# Patient Record
Sex: MALE | Race: WHITE | Employment: OTHER | URBAN - NONMETROPOLITAN AREA
[De-identification: names, ages, dates, MRNs, and addresses within clinical notes are randomized per-mention and may not be internally consistent; named-entity substitution may affect disease eponyms.]

---

## 2017-09-13 ENCOUNTER — HOSPITAL ENCOUNTER (OUTPATIENT)
Age: 67
Discharge: HOME | End: 2017-09-13
Payer: MEDICARE

## 2017-09-13 LAB
ALBUMIN SERPL-MCNC: 4.6 G/DL (ref 3.5–5.1)
ALP BLD-CCNC: 45 U/L (ref 38–126)
ALT SERPL-CCNC: 16 U/L (ref 11–66)
ANION GAP SERPL CALCULATED.3IONS-SCNC: 13 MEQ/L (ref 8–16)
AST SERPL-CCNC: 24 U/L (ref 5–40)
AVERAGE GLUCOSE: 93 MG/DL (ref 70–126)
BASOPHILS # BLD: 1.2 %
BASOPHILS ABSOLUTE: 0 THOU/MM3 (ref 0–0.1)
BILIRUB SERPL-MCNC: 0.9 MG/DL (ref 0.3–1.2)
BILIRUBIN URINE: NEGATIVE
BLOOD, URINE: NEGATIVE
BUN BLDV-MCNC: 18 MG/DL (ref 7–22)
CALCIUM SERPL-MCNC: 9.5 MG/DL (ref 8.5–10.5)
CHARACTER, URINE: CLEAR
CHLORIDE BLD-SCNC: 99 MEQ/L (ref 98–111)
CHOLESTEROL, TOTAL: 222 MG/DL (ref 100–199)
CO2: 27 MEQ/L (ref 23–33)
COLOR: YELLOW
CREAT SERPL-MCNC: 0.7 MG/DL (ref 0.4–1.2)
EOSINOPHIL # BLD: 0.7 %
EOSINOPHILS ABSOLUTE: 0 THOU/MM3 (ref 0–0.4)
GFR SERPL CREATININE-BSD FRML MDRD: > 90 ML/MIN/1.73M2
GLUCOSE BLD-MCNC: 89 MG/DL (ref 70–108)
GLUCOSE URINE: NEGATIVE MG/DL
HBA1C MFR BLD: 5.1 % (ref 4.4–6.4)
HCT VFR BLD CALC: 46.2 % (ref 42–52)
HDLC SERPL-MCNC: 96 MG/DL
HEMOGLOBIN: 15.9 GM/DL (ref 14–18)
HEPATITIS C ANTIBODY: NEGATIVE
KETONES, URINE: 40
LDL CHOLESTEROL CALCULATED: 118 MG/DL
LEUKOCYTE ESTERASE, URINE: NEGATIVE
LYMPHOCYTES # BLD: 27.9 %
LYMPHOCYTES ABSOLUTE: 1 THOU/MM3 (ref 1–4.8)
MACROCYTES: ABNORMAL
MCH RBC QN AUTO: 34.1 PG (ref 27–31)
MCHC RBC AUTO-ENTMCNC: 34.3 GM/DL (ref 33–37)
MCV RBC AUTO: 99.5 FL (ref 80–94)
MONOCYTES # BLD: 8.9 %
MONOCYTES ABSOLUTE: 0.3 THOU/MM3 (ref 0.4–1.3)
NITRITE, URINE: NEGATIVE
NUCLEATED RED BLOOD CELLS: 0 /100 WBC
PDW BLD-RTO: 13.7 % (ref 11.5–14.5)
PH UA: 6.5
PLATELET # BLD: 199 THOU/MM3 (ref 130–400)
PMV BLD AUTO: 7.8 MCM (ref 7.4–10.4)
POTASSIUM SERPL-SCNC: 5.2 MEQ/L (ref 3.5–5.2)
PROSTATE SPECIFIC ANTIGEN: 1.41 NG/ML (ref 0–1)
PROTEIN UA: NEGATIVE
RBC # BLD: 4.64 MILL/MM3 (ref 4.7–6.1)
RBC # BLD: NORMAL 10*6/UL
RHEUMATOID FACTOR: 36 IU/ML (ref 0–13)
SEDIMENTATION RATE, ERYTHROCYTE: 3 MM/HR (ref 0–10)
SEG NEUTROPHILS: 61.3 %
SEGMENTED NEUTROPHILS ABSOLUTE COUNT: 2.1 THOU/MM3 (ref 1.8–7.7)
SODIUM BLD-SCNC: 139 MEQ/L (ref 135–145)
SPECIFIC GRAVITY, URINE: 1.02 (ref 1–1.03)
TOTAL PROTEIN: 7.2 G/DL (ref 6.1–8)
TRIGL SERPL-MCNC: 39 MG/DL (ref 0–199)
TSH SERPL DL<=0.05 MIU/L-ACNC: 1.55 UIU/ML (ref 0.4–4.2)
URIC ACID: 7.5 MG/DL (ref 3.7–7)
UROBILINOGEN, URINE: 1 EU/DL
WBC # BLD: 3.5 THOU/MM3 (ref 4.8–10.8)

## 2017-09-13 PROCEDURE — 80061 LIPID PANEL: CPT

## 2017-09-13 PROCEDURE — 84550 ASSAY OF BLOOD/URIC ACID: CPT

## 2017-09-13 PROCEDURE — 86430 RHEUMATOID FACTOR TEST QUAL: CPT

## 2017-09-13 PROCEDURE — 36415 COLL VENOUS BLD VENIPUNCTURE: CPT

## 2017-09-13 PROCEDURE — G0103 PSA SCREENING: HCPCS

## 2017-09-13 PROCEDURE — 81003 URINALYSIS AUTO W/O SCOPE: CPT

## 2017-09-13 PROCEDURE — 85651 RBC SED RATE NONAUTOMATED: CPT

## 2017-09-13 PROCEDURE — 80050 GENERAL HEALTH PANEL: CPT

## 2017-09-13 PROCEDURE — 86038 ANTINUCLEAR ANTIBODIES: CPT

## 2017-09-13 PROCEDURE — 83036 HEMOGLOBIN GLYCOSYLATED A1C: CPT

## 2017-09-13 PROCEDURE — 86803 HEPATITIS C AB TEST: CPT

## 2017-09-15 LAB — ANA SCREEN: NORMAL

## 2017-09-28 ENCOUNTER — HOSPITAL ENCOUNTER (OUTPATIENT)
Age: 67
Discharge: HOME | End: 2017-09-28
Payer: MEDICARE

## 2017-09-28 LAB
BASOPHILS # BLD: 0.8 %
BASOPHILS ABSOLUTE: 0 THOU/MM3 (ref 0–0.1)
EOSINOPHIL # BLD: 0.3 %
EOSINOPHILS ABSOLUTE: 0 THOU/MM3 (ref 0–0.4)
FOLATE: 12 NG/ML (ref 4.8–24.2)
HCT VFR BLD CALC: 44.4 % (ref 42–52)
HEMOGLOBIN: 15.6 GM/DL (ref 14–18)
LYMPHOCYTES # BLD: 31.8 %
LYMPHOCYTES ABSOLUTE: 1.5 THOU/MM3 (ref 1–4.8)
MACROCYTES: ABNORMAL
MCH RBC QN AUTO: 35.1 PG (ref 27–31)
MCHC RBC AUTO-ENTMCNC: 35.1 GM/DL (ref 33–37)
MCV RBC AUTO: 100.1 FL (ref 80–94)
MONOCYTES # BLD: 10.1 %
MONOCYTES ABSOLUTE: 0.5 THOU/MM3 (ref 0.4–1.3)
NUCLEATED RED BLOOD CELLS: 0 /100 WBC
PDW BLD-RTO: 13.5 % (ref 11.5–14.5)
PLATELET # BLD: 220 THOU/MM3 (ref 130–400)
PMV BLD AUTO: 7.7 MCM (ref 7.4–10.4)
RBC # BLD: 4.44 MILL/MM3 (ref 4.7–6.1)
RBC # BLD: NORMAL 10*6/UL
SEG NEUTROPHILS: 57 %
SEGMENTED NEUTROPHILS ABSOLUTE COUNT: 2.6 THOU/MM3 (ref 1.8–7.7)
VITAMIN B-12: 148 PG/ML (ref 211–911)
WBC # BLD: 4.6 THOU/MM3 (ref 4.8–10.8)

## 2017-09-28 PROCEDURE — 82607 VITAMIN B-12: CPT

## 2017-09-28 PROCEDURE — 82746 ASSAY OF FOLIC ACID SERUM: CPT

## 2017-09-28 PROCEDURE — 36415 COLL VENOUS BLD VENIPUNCTURE: CPT

## 2017-09-28 PROCEDURE — 85025 COMPLETE CBC W/AUTO DIFF WBC: CPT

## 2017-10-31 ENCOUNTER — HOSPITAL ENCOUNTER (OUTPATIENT)
Dept: GENERAL RADIOLOGY | Age: 67
Discharge: HOME | End: 2017-10-31
Payer: MEDICARE

## 2017-10-31 ENCOUNTER — HOSPITAL ENCOUNTER (OUTPATIENT)
Age: 67
Discharge: HOME | End: 2017-10-31
Payer: MEDICARE

## 2017-10-31 DIAGNOSIS — R52 PAIN: ICD-10-CM

## 2017-10-31 DIAGNOSIS — R76.8 RHEUMATOID FACTOR POSITIVE: ICD-10-CM

## 2017-10-31 LAB
C-REACTIVE PROTEIN: 0.04 MG/DL (ref 0–1)
HAV IGM SER IA-ACNC: NEGATIVE
HEPATITIS B CORE IGM ANTIBODY: NEGATIVE
HEPATITIS B SURFACE ANTIGEN: NEGATIVE
HEPATITIS C ANTIBODY: NEGATIVE

## 2017-10-31 PROCEDURE — 86200 CCP ANTIBODY: CPT

## 2017-10-31 PROCEDURE — 73120 X-RAY EXAM OF HAND: CPT

## 2017-10-31 PROCEDURE — 80074 ACUTE HEPATITIS PANEL: CPT

## 2017-10-31 PROCEDURE — 86140 C-REACTIVE PROTEIN: CPT

## 2017-10-31 PROCEDURE — 36415 COLL VENOUS BLD VENIPUNCTURE: CPT

## 2017-11-02 LAB — CYCLIC CITRULLIN PEPTIDE AB: 5 UNITS (ref 0–19)

## 2017-11-03 ENCOUNTER — HOSPITAL ENCOUNTER (OUTPATIENT)
Age: 67
Setting detail: OUTPATIENT SURGERY
Discharge: HOME | End: 2017-11-03
Attending: INTERNAL MEDICINE | Admitting: INTERNAL MEDICINE
Payer: MEDICARE

## 2017-11-03 ENCOUNTER — ANESTHESIA (OUTPATIENT)
Dept: ENDOSCOPY | Age: 67
End: 2017-11-03
Payer: MEDICARE

## 2017-11-03 ENCOUNTER — ANESTHESIA EVENT (OUTPATIENT)
Dept: ENDOSCOPY | Age: 67
End: 2017-11-03
Payer: MEDICARE

## 2017-11-03 VITALS
DIASTOLIC BLOOD PRESSURE: 73 MMHG | RESPIRATION RATE: 16 BRPM | HEART RATE: 78 BPM | BODY MASS INDEX: 22.01 KG/M2 | WEIGHT: 177 LBS | HEIGHT: 75 IN | OXYGEN SATURATION: 98 % | TEMPERATURE: 97.8 F | SYSTOLIC BLOOD PRESSURE: 109 MMHG

## 2017-11-03 VITALS
RESPIRATION RATE: 18 BRPM | DIASTOLIC BLOOD PRESSURE: 67 MMHG | SYSTOLIC BLOOD PRESSURE: 103 MMHG | OXYGEN SATURATION: 97 %

## 2017-11-03 PROCEDURE — 3700000000 HC ANESTHESIA ATTENDED CARE: Performed by: INTERNAL MEDICINE

## 2017-11-03 PROCEDURE — 3609027000 HC COLONOSCOPY: Performed by: INTERNAL MEDICINE

## 2017-11-03 PROCEDURE — 3700000001 HC ADD 15 MINUTES (ANESTHESIA): Performed by: INTERNAL MEDICINE

## 2017-11-03 PROCEDURE — 2500000003 HC RX 250 WO HCPCS: Performed by: NURSE ANESTHETIST, CERTIFIED REGISTERED

## 2017-11-03 PROCEDURE — 7100000000 HC PACU RECOVERY - FIRST 15 MIN: Performed by: INTERNAL MEDICINE

## 2017-11-03 PROCEDURE — 2580000003 HC RX 258: Performed by: NURSE ANESTHETIST, CERTIFIED REGISTERED

## 2017-11-03 PROCEDURE — 6360000002 HC RX W HCPCS: Performed by: NURSE ANESTHETIST, CERTIFIED REGISTERED

## 2017-11-03 PROCEDURE — 2580000003 HC RX 258: Performed by: INTERNAL MEDICINE

## 2017-11-03 RX ORDER — SODIUM CHLORIDE 9 MG/ML
INJECTION, SOLUTION INTRAVENOUS CONTINUOUS PRN
Status: DISCONTINUED | OUTPATIENT
Start: 2017-11-03 | End: 2017-11-03 | Stop reason: SDUPTHER

## 2017-11-03 RX ORDER — PROPOFOL 10 MG/ML
INJECTION, EMULSION INTRAVENOUS PRN
Status: DISCONTINUED | OUTPATIENT
Start: 2017-11-03 | End: 2017-11-03 | Stop reason: SDUPTHER

## 2017-11-03 RX ORDER — ALLOPURINOL 100 MG/1
100 TABLET ORAL DAILY
COMMUNITY
End: 2018-06-01

## 2017-11-03 RX ORDER — SODIUM CHLORIDE 450 MG/100ML
INJECTION, SOLUTION INTRAVENOUS CONTINUOUS
Status: DISCONTINUED | OUTPATIENT
Start: 2017-11-03 | End: 2017-11-03 | Stop reason: HOSPADM

## 2017-11-03 RX ORDER — LIDOCAINE HYDROCHLORIDE 20 MG/ML
INJECTION, SOLUTION INFILTRATION; PERINEURAL PRN
Status: DISCONTINUED | OUTPATIENT
Start: 2017-11-03 | End: 2017-11-03 | Stop reason: SDUPTHER

## 2017-11-03 RX ADMIN — PROPOFOL 300 MG: 10 INJECTION, EMULSION INTRAVENOUS at 10:00

## 2017-11-03 RX ADMIN — PROPOFOL 200 MG: 10 INJECTION, EMULSION INTRAVENOUS at 09:58

## 2017-11-03 RX ADMIN — SODIUM CHLORIDE: 4.5 INJECTION, SOLUTION INTRAVENOUS at 09:46

## 2017-11-03 RX ADMIN — LIDOCAINE HYDROCHLORIDE 60 MG: 20 INJECTION, SOLUTION INFILTRATION; PERINEURAL at 09:58

## 2017-11-03 RX ADMIN — SODIUM CHLORIDE: 9 INJECTION, SOLUTION INTRAVENOUS at 09:55

## 2017-11-03 ASSESSMENT — PAIN - FUNCTIONAL ASSESSMENT: PAIN_FUNCTIONAL_ASSESSMENT: 0-10

## 2017-11-03 NOTE — BRIEF OP NOTE
Brief Postoperative Note  ______________________________________________________________    Patient: Dayanna Galvan  YOB: 1950  MRN: [id number]  Date of Procedure: 11/3/2017    Pre-Op Diagnosis: FAMILY HISTORY OF COLON CANCER    Post-Op Diagnosis: Same       Procedure(s):  COLONOSCOPY    Anesthesia: Monitor Anesthesia Care    Surgeon(s):  Ginny Nash MD    Staff:  * No surgical staff found *     Estimated Blood Loss: * No values recorded between 11/3/2017  9:55 AM and 11/3/2017 09:97 AM *    Complications: None    Specimens:   * No specimens in log *    Implants:  * No implants in log *      Drains:      Findings: nl colon, rectal varices    Ginny Nash MD  Date: 11/3/2017  Time: 10:50 AM

## 2017-11-04 NOTE — OP NOTE
[address] [place] [address]                               OPERATIVE REPORT    PATIENT NAME: Nadia Pressley                        :         1950  MED REC NO:   [id number]                           ROOM:  ACCOUNT NO:   [de-identified]                           ADMIT DATE:  2017  PROVIDER:     Louetta Goodness, M.D. Elias Goltz OF PROCEDURE:  2017        PROCEDURE:  Colonoscopy. INDICATION:  A 47-year-old pleasant male, who has a strong family  history of colon cancer in his father. He is undergoing colorectal  cancer screening evaluation. Denied any abdominal pain. Please see  my brief history for details and for physical examination. ASA CLASSIFICATION:  As per Anesthesia. Please see Anesthesia note  for details. INSTRUMENT:  PCF-H190AL pediatric colonoscope and CF-Q 190L  colonoscope. PHOTOGRAPHS:  Yes. BIOPSIES:  No.    Procedure, indications, and complications including, but not limited  to perforation, bleeding, infection, adverse reaction to medicine,  very slight chance of missing significant lesions discussed with the  patient. The patient expressed his understanding and a written  consent was obtained. Colonoscopy report after the rectal examination with the PCF-H190AL  pediatric colonoscope was inserted into the rectum and advanced up to  the transverse colon and the scope was not insufflating well. At that  time, the scope was changed to adult 190AL colonoscope and the scope  was inserted into the rectum and advanced up to the cecum with gentle  sigmoid pressure. On careful inspection, the preparation was good. On withdrawal of the scope, the cecum looks normal as shown in picture  #2. Ileocecal valve and ascending colon look normal as shown in  picture #1, 3, and 4. Transverse colon looks normal as shown in  picture #5 and 6. Descending colon looks normal as shown in picture  #7 and 8.   Sigmoid colon looks normal as shown in picture #9. In the  rectum, the patient had rectal varices noted as shown in picture #10. Small internal hemorrhoid hypertrophied anal papillae noted as shown  in picture #11. Air was withdrawn as the scope was removed. The  patient tolerated the procedure well without any immediate  complications. Vitals including blood pressure, heart rate, and pulse  ox were stable during the procedure and after the procedure. The  patient transferred to the recovery room in stable condition. IMPRESSION:  Colonoscopy to cecum, normal colon. No mucosal  irregularities noted. Small rectal varices noted. My recommendations, high-fiber diet, fiber supplements, followup  colonoscopy in 5 years because of the strong family history of colon  cancer, advised him to follow up with Dr. Yulia Valladares, follow up with me if  any further gastrointestinal problems should arise. Thank you Dr. Yulia Valladares for letting me to do procedure on this patient. Do not hesitate to call me if you have any questions. My  recommendations are above.         Marcelino Wheeler M.D.    D: 11/03/2017 10:53:21       T: 11/03/2017 10:56:02     [email or web address]  Job#: [id number]     Doc#: [id number]    CC:TRENT Tyler

## 2018-03-01 ENCOUNTER — HOSPITAL ENCOUNTER (OUTPATIENT)
Age: 68
Discharge: HOME | End: 2018-03-01
Payer: MEDICARE

## 2018-03-01 LAB
BASOPHILS # BLD: 0.9 %
BASOPHILS ABSOLUTE: 0 THOU/MM3 (ref 0–0.1)
EOSINOPHIL # BLD: 0.8 %
EOSINOPHILS ABSOLUTE: 0 THOU/MM3 (ref 0–0.4)
FOLATE: 15.3 NG/ML (ref 4.8–24.2)
HCT VFR BLD CALC: 46.5 % (ref 42–52)
HEMOGLOBIN: 16.1 GM/DL (ref 14–18)
LYMPHOCYTES # BLD: 38.9 %
LYMPHOCYTES ABSOLUTE: 1.3 THOU/MM3 (ref 1–4.8)
MCH RBC QN AUTO: 33.7 PG (ref 27–31)
MCHC RBC AUTO-ENTMCNC: 34.6 GM/DL (ref 33–37)
MCV RBC AUTO: 97.6 FL (ref 80–94)
MONOCYTES # BLD: 10.8 %
MONOCYTES ABSOLUTE: 0.4 THOU/MM3 (ref 0.4–1.3)
NUCLEATED RED BLOOD CELLS: 0 /100 WBC
PDW BLD-RTO: 12.9 % (ref 11.5–14.5)
PLATELET # BLD: 207 THOU/MM3 (ref 130–400)
PMV BLD AUTO: 7.7 FL (ref 7.4–10.4)
RBC # BLD: 4.77 MILL/MM3 (ref 4.7–6.1)
SEG NEUTROPHILS: 48.6 %
SEGMENTED NEUTROPHILS ABSOLUTE COUNT: 1.6 THOU/MM3 (ref 1.8–7.7)
URIC ACID: 5 MG/DL (ref 3.7–7)
VITAMIN B-12: 574 PG/ML (ref 211–911)
WBC # BLD: 3.3 THOU/MM3 (ref 4.8–10.8)

## 2018-03-01 PROCEDURE — 82607 VITAMIN B-12: CPT

## 2018-03-01 PROCEDURE — 36415 COLL VENOUS BLD VENIPUNCTURE: CPT

## 2018-03-01 PROCEDURE — 84550 ASSAY OF BLOOD/URIC ACID: CPT

## 2018-03-01 PROCEDURE — 85025 COMPLETE CBC W/AUTO DIFF WBC: CPT

## 2018-03-01 PROCEDURE — 82746 ASSAY OF FOLIC ACID SERUM: CPT

## 2018-03-08 ENCOUNTER — OFFICE VISIT (OUTPATIENT)
Dept: FAMILY MEDICINE CLINIC | Age: 68
End: 2018-03-08
Payer: MEDICARE

## 2018-03-08 VITALS
OXYGEN SATURATION: 99 % | BODY MASS INDEX: 23.43 KG/M2 | TEMPERATURE: 97.5 F | WEIGHT: 188.4 LBS | SYSTOLIC BLOOD PRESSURE: 127 MMHG | HEIGHT: 75 IN | DIASTOLIC BLOOD PRESSURE: 86 MMHG | HEART RATE: 73 BPM

## 2018-03-08 DIAGNOSIS — G62.9 NEUROPATHY: ICD-10-CM

## 2018-03-08 DIAGNOSIS — R79.89 LOW VITAMIN D LEVEL: ICD-10-CM

## 2018-03-08 DIAGNOSIS — I01.1 RHEUMATOID AORTITIS: Primary | ICD-10-CM

## 2018-03-08 DIAGNOSIS — M10.9 GOUT, UNSPECIFIED CAUSE, UNSPECIFIED CHRONICITY, UNSPECIFIED SITE: ICD-10-CM

## 2018-03-08 PROCEDURE — G8484 FLU IMMUNIZE NO ADMIN: HCPCS | Performed by: FAMILY MEDICINE

## 2018-03-08 PROCEDURE — 99203 OFFICE O/P NEW LOW 30 MIN: CPT | Performed by: FAMILY MEDICINE

## 2018-03-08 PROCEDURE — G8420 CALC BMI NORM PARAMETERS: HCPCS | Performed by: FAMILY MEDICINE

## 2018-03-08 PROCEDURE — 1123F ACP DISCUSS/DSCN MKR DOCD: CPT | Performed by: FAMILY MEDICINE

## 2018-03-08 PROCEDURE — 3017F COLORECTAL CA SCREEN DOC REV: CPT | Performed by: FAMILY MEDICINE

## 2018-03-08 PROCEDURE — G8427 DOCREV CUR MEDS BY ELIG CLIN: HCPCS | Performed by: FAMILY MEDICINE

## 2018-03-08 PROCEDURE — 1036F TOBACCO NON-USER: CPT | Performed by: FAMILY MEDICINE

## 2018-03-08 PROCEDURE — 4040F PNEUMOC VAC/ADMIN/RCVD: CPT | Performed by: FAMILY MEDICINE

## 2018-03-08 RX ORDER — CHLORAL HYDRATE 500 MG
1000 CAPSULE ORAL 3 TIMES DAILY
Qty: 90 CAPSULE | Refills: 3 | Status: SHIPPED | OUTPATIENT
Start: 2018-03-08 | End: 2018-06-01 | Stop reason: SDUPTHER

## 2018-03-08 RX ORDER — MAGNESIUM 200 MG
TABLET ORAL
Refills: 0 | COMMUNITY
Start: 2018-02-02

## 2018-03-08 ASSESSMENT — ENCOUNTER SYMPTOMS
BLOOD IN STOOL: 0
ABDOMINAL PAIN: 0
DIARRHEA: 0
SHORTNESS OF BREATH: 0
VOMITING: 0
EYE PAIN: 0
COUGH: 0
CONSTIPATION: 0
NAUSEA: 0
TROUBLE SWALLOWING: 0

## 2018-03-08 ASSESSMENT — PATIENT HEALTH QUESTIONNAIRE - PHQ9
2. FEELING DOWN, DEPRESSED OR HOPELESS: 0
1. LITTLE INTEREST OR PLEASURE IN DOING THINGS: 0
SUM OF ALL RESPONSES TO PHQ9 QUESTIONS 1 & 2: 0
SUM OF ALL RESPONSES TO PHQ QUESTIONS 1-9: 0

## 2018-03-08 NOTE — PROGRESS NOTES
[place] is a 74 y.o. male who presents today for:  Chief Complaint   Patient presents with    New Patient     Declines all vaccines     Establish Care    Numbness     and tingling in both feet. For a while, worsening. Cold feet.  Other     Patient states Dr. Tavo Isaac (previous PCP) states he is low on B12 and has crystals in joints. Goals      Exercise 3x per week (30 min per time)           HPI:     HPI   Here to establish care    He does see Dr Robert Mott - a rheumatologist - he was just taken off of the last set of medications - to go to the next set of medications - he would have to give up drinking - he does not like taking a lot of pills    He had a blood test and was low on b12 - so is on the vitamin now    He is not having any symptoms of a large prostate - did have the psa checked last year - urinates only once a night    On the allopurinol since last august  He has ankle redness if he gets cold - it feels like his shoes are too tight all th etime - the sandals are fine - but socks and shoes willdrive him nuts after a few hours - tried the B vitamin. Has been on the b vitamin since last august    Had back surgery in 200 l5 and he was in a lot of pain that got better right after the surgery - and did not have to take pain meds after that    No question data found.     Past Medical History:   Diagnosis Date    Arthritis     Gout     Vitamin B12 deficiency       Past Surgical History:   Procedure Laterality Date    BACK SURGERY      COLONOSCOPY      EYE SURGERY      FOOT SURGERY      [place] COLON CA SCRN NOT  [address] IND Left 11/3/2017    COLONOSCOPY performed by Kelly Mcnamara MD at [facility]     Family History   Problem Relation Age of Onset    Other Mother      Gout    Heart Disease Mother     Heart Disease Father     No Known Problems Sister     High Cholesterol Brother     Arthritis Brother      Social History   Substance Use Topics    Smoking status: Never Smoker    Vitamins-Lipotropics (BALANCED B-100 COMPLEX CR) TBCR    Magnesium       Plan: Will change the B vitamin - to long acting B complex - b100 complex - CNA NOT be a super complex    Will add some fish oil before you eat with eachmeal - will go 1 gram three times a day    Will recheck the PSA next year    Will check on the pneumonia shot - and bring in the immunizations    Will check the vitamin D with the next blood draw    Will stop the allopurinol for now - and take it if you get a gout attack for a week    If still feeling the foot tightness or joint pains in 3 months will go ahead and get the bloodwork     Will see you in 3 months and ask about the weakness of the legs and the neuropathy    No Follow-up on file. Orders Placed:  Orders Placed This Encounter   Procedures    Vitamin D 25 Hydroxy    Magnesium    CBC Auto Differential     Medications Prescribed:  Orders Placed This Encounter   Medications    Vitamins-Lipotropics (BALANCED B-100 COMPLEX CR) TBCR     Sig: Take 1 tablet by mouth 4 times daily (after meals and at bedtime)     Dispense:  120 tablet     Refill:  5    Omega-3 Fatty Acids (FISH OIL) 1000 MG CAPS     Sig: Take 1 capsule by mouth 3 times daily     Dispense:  90 capsule     Refill:  3        Patient given educational materials - see patient instructions. Discussed use, benefit, and side effects of prescribed medications. All patient questions answered. Pt voiced understanding. Reviewed health maintenance. Instructed to continue current medications, diet and exercise. Patient agreed with treatment plan. Follow up as directed.      Electronically signed by Nolvia Christy DO on 3/8/2018 at 9:00 AM

## 2018-03-08 NOTE — PATIENT INSTRUCTIONS
brains. ¨ Meats, including gay, beef, pork, and lamb. ¨ Game meats and any other meats in large amounts. ¨ Anchovies, sardines, herring, mackerel, and scallops. ¨ Gravy. ¨ Beer. Where can you learn more? Go to [email or web address] and sign in to your [employer] account. Enter F448 in the [employer] box to learn more about \"Purine-Restricted Diet: Care Instructions. \"     If you do not have an account, please click on the \"Sign Up Now\" link. Current as of: May 12, 2017  Content Version: 11.5  © 8664-5457 [employer]. Care instructions adapted under license by [facility] [address] If you have questions about a medical condition or this instruction, always ask your healthcare professional. Patience Kincaid any warranty or liability for your use of this information.      ill change the B vitamin - to long acting B complex - b100 complex - CNA NOT be a super complex    Will add some fish oil before you eat with eachmeal - will go 1 gram three times a day    Will recheck the PSA next year    Will check on the pneumonia shot - and bring in the immunizations    Will check the vitamin D with the next blood draw    Will stop the allopurinol for now - and take it if you get a gout attack for a week    If still feeling the foot tightness or joint pains in 3 months will go ahead and get the bloodwork     Will see you in 3 months and ask about the weakness of the legs and the neuropathy

## 2018-06-01 ENCOUNTER — OFFICE VISIT (OUTPATIENT)
Dept: FAMILY MEDICINE CLINIC | Age: 68
End: 2018-06-01
Payer: MEDICARE

## 2018-06-01 VITALS
TEMPERATURE: 96.8 F | WEIGHT: 183.2 LBS | DIASTOLIC BLOOD PRESSURE: 88 MMHG | OXYGEN SATURATION: 97 % | HEIGHT: 74 IN | SYSTOLIC BLOOD PRESSURE: 133 MMHG | BODY MASS INDEX: 23.51 KG/M2 | HEART RATE: 98 BPM

## 2018-06-01 DIAGNOSIS — G62.9 NEUROPATHY: ICD-10-CM

## 2018-06-01 DIAGNOSIS — M25.571 CHRONIC PAIN OF BOTH ANKLES: ICD-10-CM

## 2018-06-01 DIAGNOSIS — R20.9 BILATERAL COLD FEET: ICD-10-CM

## 2018-06-01 DIAGNOSIS — R97.20 ELEVATED PSA: ICD-10-CM

## 2018-06-01 DIAGNOSIS — M05.772 RHEUMATOID ARTHRITIS INVOLVING BOTH ANKLES WITH POSITIVE RHEUMATOID FACTOR (HCC): Primary | ICD-10-CM

## 2018-06-01 DIAGNOSIS — G89.29 CHRONIC PAIN OF BOTH ANKLES: ICD-10-CM

## 2018-06-01 DIAGNOSIS — M25.572 CHRONIC PAIN OF BOTH ANKLES: ICD-10-CM

## 2018-06-01 DIAGNOSIS — R09.89 OTHER SPECIFIED SYMPTOMS AND SIGNS INVOLVING THE CIRCULATORY AND RESPIRATORY SYSTEMS: ICD-10-CM

## 2018-06-01 DIAGNOSIS — M05.771 RHEUMATOID ARTHRITIS INVOLVING BOTH ANKLES WITH POSITIVE RHEUMATOID FACTOR (HCC): Primary | ICD-10-CM

## 2018-06-01 PROCEDURE — 93922 UPR/L XTREMITY ART 2 LEVELS: CPT | Performed by: FAMILY MEDICINE

## 2018-06-01 PROCEDURE — 99214 OFFICE O/P EST MOD 30 MIN: CPT | Performed by: FAMILY MEDICINE

## 2018-06-01 RX ORDER — CHLORAL HYDRATE 500 MG
1000 CAPSULE ORAL 3 TIMES DAILY
Qty: 90 CAPSULE | Refills: 5 | Status: SHIPPED | OUTPATIENT
Start: 2018-06-01 | End: 2021-04-06

## 2018-06-01 ASSESSMENT — ENCOUNTER SYMPTOMS
EYE PAIN: 0
NAUSEA: 0
ABDOMINAL PAIN: 0
CONSTIPATION: 0
DIARRHEA: 0
SHORTNESS OF BREATH: 0
COUGH: 0
VOMITING: 0
TROUBLE SWALLOWING: 0
BLOOD IN STOOL: 0

## 2018-07-31 ENCOUNTER — TELEPHONE (OUTPATIENT)
Dept: FAMILY MEDICINE CLINIC | Age: 68
End: 2018-07-31

## 2018-07-31 ENCOUNTER — HOSPITAL ENCOUNTER (OUTPATIENT)
Dept: GENERAL RADIOLOGY | Age: 68
Discharge: HOME | End: 2018-07-31
Payer: MEDICARE

## 2018-07-31 ENCOUNTER — HOSPITAL ENCOUNTER (OUTPATIENT)
Age: 68
Discharge: HOME | End: 2018-07-31
Payer: MEDICARE

## 2018-07-31 DIAGNOSIS — M25.572 CHRONIC PAIN OF BOTH ANKLES: ICD-10-CM

## 2018-07-31 DIAGNOSIS — G89.29 CHRONIC PAIN OF BOTH ANKLES: ICD-10-CM

## 2018-07-31 DIAGNOSIS — M25.571 CHRONIC PAIN OF BOTH ANKLES: ICD-10-CM

## 2018-07-31 DIAGNOSIS — I01.1 RHEUMATOID AORTITIS: ICD-10-CM

## 2018-07-31 DIAGNOSIS — R97.20 ELEVATED PSA: ICD-10-CM

## 2018-07-31 DIAGNOSIS — G62.9 NEUROPATHY: ICD-10-CM

## 2018-07-31 DIAGNOSIS — R79.89 LOW VITAMIN D LEVEL: ICD-10-CM

## 2018-07-31 DIAGNOSIS — M19.079 ARTHRITIS OF FOOT: Primary | ICD-10-CM

## 2018-07-31 LAB
BASOPHILS # BLD: 0.6 %
BASOPHILS ABSOLUTE: 0 THOU/MM3 (ref 0–0.1)
EOSINOPHIL # BLD: 0.8 %
EOSINOPHILS ABSOLUTE: 0 THOU/MM3 (ref 0–0.4)
ERYTHROCYTE [DISTWIDTH] IN BLOOD BY AUTOMATED COUNT: 13.1 % (ref 11.5–14.5)
ERYTHROCYTE [DISTWIDTH] IN BLOOD BY AUTOMATED COUNT: 47.2 FL (ref 35–45)
HCT VFR BLD CALC: 46.8 % (ref 42–52)
HEMOGLOBIN: 15.9 GM/DL (ref 14–18)
IMMATURE GRANS (ABS): 0.01 THOU/MM3 (ref 0–0.07)
IMMATURE GRANULOCYTES: 0.3 %
LYMPHOCYTES # BLD: 32.1 %
LYMPHOCYTES ABSOLUTE: 1.2 THOU/MM3 (ref 1–4.8)
MAGNESIUM: 2.1 MG/DL (ref 1.6–2.4)
MCH RBC QN AUTO: 33.3 PG (ref 26–33)
MCHC RBC AUTO-ENTMCNC: 34 GM/DL (ref 32.2–35.5)
MCV RBC AUTO: 97.9 FL (ref 80–94)
MONOCYTES # BLD: 10.8 %
MONOCYTES ABSOLUTE: 0.4 THOU/MM3 (ref 0.4–1.3)
NUCLEATED RED BLOOD CELLS: 0 /100 WBC
PLATELET # BLD: 218 THOU/MM3 (ref 130–400)
PMV BLD AUTO: 9.4 FL (ref 9.4–12.4)
PROSTATE SPECIFIC ANTIGEN: 1.31 NG/ML (ref 0–1)
RBC # BLD: 4.78 MILL/MM3 (ref 4.7–6.1)
SEG NEUTROPHILS: 55.4 %
SEGMENTED NEUTROPHILS ABSOLUTE COUNT: 2 THOU/MM3 (ref 1.8–7.7)
VITAMIN D 25-HYDROXY: 50 NG/ML (ref 30–100)
WBC # BLD: 3.6 THOU/MM3 (ref 4.8–10.8)

## 2018-07-31 PROCEDURE — 84153 ASSAY OF PSA TOTAL: CPT

## 2018-07-31 PROCEDURE — 83735 ASSAY OF MAGNESIUM: CPT

## 2018-07-31 PROCEDURE — 82306 VITAMIN D 25 HYDROXY: CPT

## 2018-07-31 PROCEDURE — 73600 X-RAY EXAM OF ANKLE: CPT

## 2018-07-31 PROCEDURE — 36415 COLL VENOUS BLD VENIPUNCTURE: CPT

## 2018-07-31 PROCEDURE — 85025 COMPLETE CBC W/AUTO DIFF WBC: CPT

## 2018-07-31 NOTE — TELEPHONE ENCOUNTER
----- Message from José Kang DO sent at 7/31/2018  5:29 PM EDT -----  The bloodwork was ok - the psa was elevated again but a little lower this time.   Would you like to get a urology referral?

## 2018-08-20 NOTE — TELEPHONE ENCOUNTER
Sandy Robertson called back wanting a referral, he will check his insurance on coverage, (pending referral for Dr. Sofia Andersen, [facility] foot and ankle doctor).  [facility],  He was wondering if he still needed to completed: XR LUMBAR SPINE (MIN 4 VIEWS) , Please advise.

## 2018-08-21 NOTE — TELEPHONE ENCOUNTER
[employer] Phone: [phone], spoke with Melita Johnson. xr cancelled. He still does not know where he wants the referral placed.

## 2018-08-23 NOTE — TELEPHONE ENCOUNTER
I signed the referral to [facility] without reading the note first.  We can go ahead and cancel it if he would like to wait until he sees us. Sorry.   Was a little too eager

## 2019-03-14 ENCOUNTER — OFFICE VISIT (OUTPATIENT)
Dept: FAMILY MEDICINE CLINIC | Age: 69
End: 2019-03-14
Payer: MEDICARE

## 2019-03-14 VITALS
BODY MASS INDEX: 23.74 KG/M2 | DIASTOLIC BLOOD PRESSURE: 82 MMHG | TEMPERATURE: 97.8 F | RESPIRATION RATE: 16 BRPM | HEIGHT: 74 IN | OXYGEN SATURATION: 98 % | HEART RATE: 90 BPM | WEIGHT: 185 LBS | SYSTOLIC BLOOD PRESSURE: 136 MMHG

## 2019-03-14 DIAGNOSIS — M77.9 BONE SPUR: ICD-10-CM

## 2019-03-14 DIAGNOSIS — L81.9 DISCOLORATION OF SKIN OF LOWER LEG: ICD-10-CM

## 2019-03-14 DIAGNOSIS — M19.071 ARTHRITIS OF BOTH FEET: Primary | ICD-10-CM

## 2019-03-14 DIAGNOSIS — Z09 ENCOUNTER FOR FOLLOW-UP EXAMINATION AFTER COMPLETED TREATMENT FOR CONDITIONS OTHER THAN MALIGNANT NEOPLASM: ICD-10-CM

## 2019-03-14 DIAGNOSIS — R60.0 LOWER EXTREMITY EDEMA: ICD-10-CM

## 2019-03-14 DIAGNOSIS — M19.072 ARTHRITIS OF BOTH FEET: Primary | ICD-10-CM

## 2019-03-14 PROCEDURE — G8510 SCR DEP NEG, NO PLAN REQD: HCPCS | Performed by: NURSE PRACTITIONER

## 2019-03-14 PROCEDURE — 99213 OFFICE O/P EST LOW 20 MIN: CPT | Performed by: NURSE PRACTITIONER

## 2019-03-14 PROCEDURE — 3288F FALL RISK ASSESSMENT DOCD: CPT | Performed by: NURSE PRACTITIONER

## 2019-03-14 RX ORDER — MULTIVIT-MIN/IRON/FOLIC ACID/K 18-600-40
500 CAPSULE ORAL DAILY
COMMUNITY

## 2019-03-14 ASSESSMENT — ENCOUNTER SYMPTOMS
EYE DISCHARGE: 0
BLOOD IN STOOL: 0
COLOR CHANGE: 0
ABDOMINAL PAIN: 0
COUGH: 0
SHORTNESS OF BREATH: 0
ANAL BLEEDING: 0
DIARRHEA: 0
NAUSEA: 0
SORE THROAT: 0
RHINORRHEA: 0
CONSTIPATION: 0
EYE REDNESS: 0
ABDOMINAL DISTENTION: 0

## 2019-03-14 ASSESSMENT — PATIENT HEALTH QUESTIONNAIRE - PHQ9
SUM OF ALL RESPONSES TO PHQ QUESTIONS 1-9: 0
SUM OF ALL RESPONSES TO PHQ QUESTIONS 1-9: 0
SUM OF ALL RESPONSES TO PHQ9 QUESTIONS 1 & 2: 0
2. FEELING DOWN, DEPRESSED OR HOPELESS: 0
1. LITTLE INTEREST OR PLEASURE IN DOING THINGS: 0

## 2019-04-01 ENCOUNTER — TELEPHONE (OUTPATIENT)
Dept: FAMILY MEDICINE CLINIC | Age: 69
End: 2019-04-01

## 2019-04-01 NOTE — TELEPHONE ENCOUNTER
Belkys Rose called stating Dr. Katerine Giordano referred him to:  [facility] [place]: [facility]      Address: [address] [place] [address] [address] [place] [facility]   Phone: [phone]    We did place the referral to Dr. Bev Brannon.      Belkys Rose would like XR's fax to Dr. Dudley Lele:    Alan Olmedo DO   Physician (Active)   [address] [place] [id number]     (E) [phone]   (W) [phone]

## 2019-04-13 ENCOUNTER — HOSPITAL ENCOUNTER (OUTPATIENT)
Age: 69
Discharge: HOME | End: 2019-04-13
Payer: MEDICARE

## 2019-04-13 LAB
ALBUMIN SERPL-MCNC: 4.2 G/DL (ref 3.5–5.1)
ALP BLD-CCNC: 42 U/L (ref 38–126)
ALT SERPL-CCNC: 11 U/L (ref 11–66)
ANION GAP SERPL CALCULATED.3IONS-SCNC: 11 MEQ/L (ref 8–16)
AST SERPL-CCNC: 18 U/L (ref 5–40)
AVERAGE GLUCOSE: 102 MG/DL (ref 70–126)
BASOPHILS # BLD: 0.6 %
BASOPHILS ABSOLUTE: 0 THOU/MM3 (ref 0–0.1)
BILIRUB SERPL-MCNC: 0.8 MG/DL (ref 0.3–1.2)
BUN BLDV-MCNC: 11 MG/DL (ref 7–22)
CALCIUM SERPL-MCNC: 9.1 MG/DL (ref 8.5–10.5)
CHLORIDE BLD-SCNC: 104 MEQ/L (ref 98–111)
CO2: 28 MEQ/L (ref 23–33)
CREAT SERPL-MCNC: 0.7 MG/DL (ref 0.4–1.2)
EOSINOPHIL # BLD: 0.6 %
EOSINOPHILS ABSOLUTE: 0 THOU/MM3 (ref 0–0.4)
ERYTHROCYTE [DISTWIDTH] IN BLOOD BY AUTOMATED COUNT: 12.6 % (ref 11.5–14.5)
ERYTHROCYTE [DISTWIDTH] IN BLOOD BY AUTOMATED COUNT: 45.3 FL (ref 35–45)
FOLATE: > 20 NG/ML (ref 4.8–24.2)
GFR SERPL CREATININE-BSD FRML MDRD: > 90 ML/MIN/1.73M2
GLUCOSE BLD-MCNC: 92 MG/DL (ref 70–108)
HBA1C MFR BLD: 5.4 % (ref 4.4–6.4)
HCT VFR BLD CALC: 45.4 % (ref 42–52)
HEMOGLOBIN: 15.4 GM/DL (ref 14–18)
IMMATURE GRANS (ABS): 0 THOU/MM3 (ref 0–0.07)
IMMATURE GRANULOCYTES: 0 %
LYMPHOCYTES # BLD: 37.8 %
LYMPHOCYTES ABSOLUTE: 1.3 THOU/MM3 (ref 1–4.8)
MCH RBC QN AUTO: 33.6 PG (ref 26–33)
MCHC RBC AUTO-ENTMCNC: 33.9 GM/DL (ref 32.2–35.5)
MCV RBC AUTO: 98.9 FL (ref 80–94)
MONOCYTES # BLD: 7.5 %
MONOCYTES ABSOLUTE: 0.3 THOU/MM3 (ref 0.4–1.3)
NUCLEATED RED BLOOD CELLS: 0 /100 WBC
PLATELET # BLD: 201 THOU/MM3 (ref 130–400)
PMV BLD AUTO: 9.2 FL (ref 9.4–12.4)
POTASSIUM SERPL-SCNC: 5.1 MEQ/L (ref 3.5–5.2)
RBC # BLD: 4.59 MILL/MM3 (ref 4.7–6.1)
SEG NEUTROPHILS: 53.5 %
SEGMENTED NEUTROPHILS ABSOLUTE COUNT: 1.9 THOU/MM3 (ref 1.8–7.7)
SODIUM BLD-SCNC: 143 MEQ/L (ref 135–145)
T4 FREE: 1.26 NG/DL (ref 0.93–1.76)
TOTAL PROTEIN: 6.7 G/DL (ref 6.1–8)
TSH SERPL DL<=0.05 MIU/L-ACNC: 1.22 UIU/ML (ref 0.4–4.2)
VITAMIN B-12: 351 PG/ML (ref 211–911)
WBC # BLD: 3.5 THOU/MM3 (ref 4.8–10.8)

## 2019-04-13 PROCEDURE — 84425 ASSAY OF VITAMIN B-1: CPT

## 2019-04-13 PROCEDURE — 82746 ASSAY OF FOLIC ACID SERUM: CPT

## 2019-04-13 PROCEDURE — 84165 PROTEIN E-PHORESIS SERUM: CPT

## 2019-04-13 PROCEDURE — 84443 ASSAY THYROID STIM HORMONE: CPT

## 2019-04-13 PROCEDURE — 86618 LYME DISEASE ANTIBODY: CPT

## 2019-04-13 PROCEDURE — 86141 C-REACTIVE PROTEIN HS: CPT

## 2019-04-13 PROCEDURE — 84439 ASSAY OF FREE THYROXINE: CPT

## 2019-04-13 PROCEDURE — 36415 COLL VENOUS BLD VENIPUNCTURE: CPT

## 2019-04-13 PROCEDURE — 84160 ASSAY OF PROTEIN ANY SOURCE: CPT

## 2019-04-13 PROCEDURE — 85025 COMPLETE CBC W/AUTO DIFF WBC: CPT

## 2019-04-13 PROCEDURE — 82784 ASSAY IGA/IGD/IGG/IGM EACH: CPT

## 2019-04-13 PROCEDURE — 83655 ASSAY OF LEAD: CPT

## 2019-04-13 PROCEDURE — 86334 IMMUNOFIX E-PHORESIS SERUM: CPT

## 2019-04-13 PROCEDURE — 82175 ASSAY OF ARSENIC: CPT

## 2019-04-13 PROCEDURE — 80053 COMPREHEN METABOLIC PANEL: CPT

## 2019-04-13 PROCEDURE — 83825 ASSAY OF MERCURY: CPT

## 2019-04-13 PROCEDURE — 84207 ASSAY OF VITAMIN B-6: CPT

## 2019-04-13 PROCEDURE — 82607 VITAMIN B-12: CPT

## 2019-04-13 PROCEDURE — 83036 HEMOGLOBIN GLYCOSYLATED A1C: CPT

## 2019-04-15 ENCOUNTER — TELEPHONE (OUTPATIENT)
Dept: FAMILY MEDICINE CLINIC | Age: 69
End: 2019-04-15

## 2019-04-15 NOTE — TELEPHONE ENCOUNTER
States he had nerve test done on Friday with Juana Rodríguez and was told he had neuropathy. He just needs to know if Dr Xin Clinton wants him to see Dr Yvette Stanton and if so he would like a referral. Please advise.

## 2019-04-17 LAB
C-REACTIVE PROTEIN, HIGH SENSITIVITY: 0.3 MG/L
LYME ANTIBODY: 0.22 LIV (ref 0–1.2)

## 2019-04-18 LAB
HEAVY METAL URINE: NORMAL
IMMUNOFIXATION WITH QUANT: NORMAL
VITAMIN B1 WHOLE BLOOD: 114 NMOL/L (ref 70–180)

## 2019-04-19 LAB — VITAMIN B6: 163.3 NMOL/L (ref 20–125)

## 2019-04-29 NOTE — TELEPHONE ENCOUNTER
These results do show peripheral neuropathy - or the nerves are having problems in the hands and feet. Can he come in to discuss?   It is more than a phone conversation    We can also sent to podiatry

## 2019-05-01 ENCOUNTER — OFFICE VISIT (OUTPATIENT)
Dept: FAMILY MEDICINE CLINIC | Age: 69
End: 2019-05-01
Payer: MEDICARE

## 2019-05-01 VITALS
SYSTOLIC BLOOD PRESSURE: 144 MMHG | OXYGEN SATURATION: 98 % | DIASTOLIC BLOOD PRESSURE: 90 MMHG | WEIGHT: 181 LBS | BODY MASS INDEX: 23.23 KG/M2 | RESPIRATION RATE: 12 BRPM | TEMPERATURE: 97.9 F | HEIGHT: 74 IN | HEART RATE: 97 BPM

## 2019-05-01 DIAGNOSIS — R94.131 ABNORMAL EMG: Primary | ICD-10-CM

## 2019-05-01 DIAGNOSIS — Z23 NEED FOR PROPHYLACTIC VACCINATION AND INOCULATION AGAINST VARICELLA: ICD-10-CM

## 2019-05-01 DIAGNOSIS — Z23 NEED FOR PROPHYLACTIC VACCINATION AGAINST DIPHTHERIA-TETANUS-PERTUSSIS (DTP): ICD-10-CM

## 2019-05-01 DIAGNOSIS — G60.9 PERIPHERAL NEUROPATHY, IDIOPATHIC: ICD-10-CM

## 2019-05-01 DIAGNOSIS — M05.80 POLYARTHRITIS WITH POSITIVE RHEUMATOID FACTOR (HCC): ICD-10-CM

## 2019-05-01 PROCEDURE — 99213 OFFICE O/P EST LOW 20 MIN: CPT | Performed by: NURSE PRACTITIONER

## 2019-05-01 ASSESSMENT — ENCOUNTER SYMPTOMS
BLOOD IN STOOL: 0
DIARRHEA: 0
SHORTNESS OF BREATH: 0
EYE REDNESS: 0
COLOR CHANGE: 1
SORE THROAT: 0
NAUSEA: 0
ABDOMINAL PAIN: 0
ANAL BLEEDING: 0
EYE DISCHARGE: 0
COUGH: 0
CONSTIPATION: 0
RHINORRHEA: 0
ABDOMINAL DISTENTION: 0

## 2019-05-01 NOTE — PATIENT INSTRUCTIONS
1. You may receive a survey about your visit with us today. The feedback from our patients helps us identify what is working well and where the service to all patients can be enhanced. Thank you! 2. Please bring in ALL medications BOTTLES, including inhalers, herbal supplements, over the counter, prescribed & non-prescribed medicine. The office would like actual medication bottles and a list.   3. Please note our OFFICE POLICIES:   1. Prior to getting your labs drawn, please check with your insurance company for benefits and eligibility of lab services. Often, insurance companies cover certain tests for preventative visits only. It is patient's responsibility to see what is covered. 2. We are unable to change a diagnosis after the test has been performed. 3. Lab orders will not be re-printed. Please hold onto your original lab orders and take them to your lab to be completed. 4. If you no show your scheduled appointment three times, you will be dismissed from this practice. 5. Reschedules must be completed 24 hours prior to your schedule appointment. 4. If the list below has been completed, PLEASE FAX RECORDS TO OUR OFFICE @ [phone]. Once the records have been received we will update your records at our office:  Health Maintenance Due   Topic Date Due    DTaP/Tdap/Td vaccine (1 - Tdap) 08/01/1971    Shingles Vaccine (1 of 2) 08/01/2002    Pneumococcal 65+ years Vaccine (1 of 2 - PCV13) 08/01/2017       Schedule your 2018/2019 flu vaccine with Dr. Rupert Figueroa call [phone]!   [facility], for anyone 9 years or older   [address]   Every Monday   8:00am-11:00am and 1:00pm-3:00pm      Patient Education     Will refer to the Neurologist for the abnormal EMG  Discussed circulation test - will hold off for right now  Discussed nerve medication - pt declines for right now  Take B complex every other day for now  Will see you back in June as scheduled, sooner pain, take it as prescribed. ? If you are not taking a prescription pain medicine, ask your doctor if you can take an over-the-counter medicine. · Save hard tasks for days when you have less pain. Follow a hard task with an easy task. And remember to take breaks. · Relax, and reduce stress. You may want to try deep breathing or meditation. These can help. · Keep moving. Gentle, daily exercise can help reduce pain. Your doctor or physical therapist can tell you what type of exercise is best for you. This may include walking, swimming, and stationary biking. It may also include stretches and range-of-motion exercises. · Try heat, cold packs, and massage. · Get enough sleep. Constant pain can make you more tired. If the pain makes it hard to sleep, talk with your doctor. · Think positively. Your thoughts can affect your pain. Do fun things to distract yourself from the pain. See a movie, read a book, listen to music, or spend time with a friend. · Keep a pain diary. Try to write down how strong your pain is and what it feels like. Also try to notice and write down how your moods, thoughts, sleep, activities, and medicine affect your pain. These notes can help you and your doctor find the best ways to treat your pain. Reducing constipation caused by pain medicine  Pain medicines often cause constipation. To reduce constipation:  · Include fruits, vegetables, beans, and whole grains in your diet each day. These foods are high in fiber. · Drink plenty of fluids, enough so that your urine is light yellow or clear like water. If you have kidney, heart, or liver disease and have to limit fluids, talk with your doctor before you increase the amount of fluids you drink. · Get some exercise every day. Build up slowly to 30 to 60 minutes a day on 5 or more days of the week. · Take a fiber supplement, such as Citrucel or Metamucil, every day if needed. Read and follow all instructions on the label.   · Schedule time each day for a bowel movement. Having a daily routine may help. Take your time and do not strain when having a bowel movement. · Ask your doctor about a laxative. The goal is to have one easy bowel movement every 1 to 2 days. Do not let constipation go untreated for more than 3 days. When should you call for help? Call your doctor now or seek immediate medical care if:    · You feel sad, anxious, or hopeless for more than a few days. This could mean you are depressed. Depression is common in people who have a lot of pain. But it can be treated.     · You have trouble with bowel movements, such as:  ? No bowel movement in 3 days. ? Blood in the anal area, in your stool, or on the toilet paper. ? Diarrhea for more than 24 hours.    Watch closely for changes in your health, and be sure to contact your doctor if:    · Your pain is getting worse.     · You can't sleep because of pain.     · You are very worried or anxious about your pain.     · You have trouble taking your pain medicine.     · You have any concerns about your pain medicine or its side effects.     · You have vomiting or cramps for more than 2 hours. Where can you learn more? Go to https://Hybrid [employer] Technologies.Cheggin. org and sign in to your Startupxplore account. Enter N320 in the Bulbstorm box to learn more about \"Neuropathic Pain: Care Instructions. \"     If you do not have an account, please click on the \"Sign Up Now\" link. Current as of: Maritza 3, 2018  Content Version: 11.9  © 6370-5978 Wandera, Incorporated. Care instructions adapted under license by [facility] ([facility]). If you have questions about a medical condition or this instruction, always ask your healthcare professional. [place] [address] any warranty or liability for your use of this information.          Patient Education        Rheumatoid Arthritis: Care Instructions  Your Care Instructions    Arthritis is a common health problem in which the joints are inflamed. There are many types of arthritis. In rheumatoid arthritis, the body's own immune system attacks the joints. This causes pain, stiffness, and swelling in the joints, especially in the hands and feet. It can become hard to open jars, write, and do other daily tasks. Sometimes rheumatoid arthritis can also cause bumps to form under the skin. Over time, rheumatoid arthritis can damage and deform joints. Early treatment with medicines may reduce your chances of having a lasting disability. Follow-up care is a key part of your treatment and safety. Be sure to make and go to all appointments, and call your doctor if you are having problems. It's also a good idea to know your test results and keep a list of the medicines you take. How can you care for yourself at home? · If your doctor recommends it, get more exercise. Walking is a good choice. If your knees or ankles hurt, try riding a stationary bike or swimming. · Move each joint gently through its full range of motion once or twice a day. · Rest joints when they are sore or overworked. Short rest breaks may help more than staying in bed. · Reach and stay at a healthy weight. Regular exercise and a healthy diet will help you do this. Extra weight can strain the joints, especially the knees and hips, and make the pain worse. Losing even a few pounds may help. · Get enough calcium and vitamin D to help prevent osteoporosis, which causes thin bones. Talk to your doctor about how much you should take. · Protect your joints from injury. Do not overuse them. Try to limit or avoid activities that cause joint pain or swelling. Use special kitchen tools and other self-help devices as well as walkers, splints, or canes if needed. · Use heat to ease pain. Take warm showers or baths. Use hot packs or a heating pad set on low. Sleep under a warm electric blanket. · Put ice or a cold pack on the area for 10 to 20 minutes at a time.  Put a thin cloth between the ice and your skin. · Take pain medicines exactly as directed. ? If the doctor gave you a prescription medicine for pain, take it as prescribed. ? If you are not taking a prescription pain medicine, ask your doctor if you can take an over-the-counter medicine. · Take an active role in managing your condition. Set up a treatment plan with your doctor, and learn as much as you can about rheumatoid arthritis. This will help you control pain and stay active. When should you call for help? Call your doctor now or seek immediate medical care if:    · You have a fever or a rash along with joint pain.     · You have joint pain that is so severe that you cannot use the joint at all.     · You have sudden swelling, redness, or pain in one or more joints, and you do not know why.     · You have back or neck pain along with weakness in your arms or legs.     · You have a loss of bowel or bladder control.    Watch closely for changes in your health, and be sure to contact your doctor if:    · You have joint pain that lasts for more than 6 weeks.     · You have side effects from your arthritis medicines, such as stomach pain, nausea, heartburn, or dark and tarlike stools. Where can you learn more? Go to https://[employer].Kuotus. org and sign in to your Pylba account. Enter K205 in the [facility] box to learn more about \"Rheumatoid Arthritis: Care Instructions. \"     If you do not have an account, please click on the \"Sign Up Now\" link. Current as of: Maritza 10, 2018  Content Version: 11.9  © 3054-2018 Healthwise, Incorporated. Care instructions adapted under license by [facility] ([facility]). If you have questions about a medical condition or this instruction, always ask your healthcare professional. [place] [address] any warranty or liability for your use of this information. Patient Education        Rheumatoid Factor: About This Test  What is it?     A rheumatoid factor (RF) blood test measures the amount of the RF antibody in your blood. The RF antibody can attach to normal body tissue, causing damage. A high RF level can be caused by several autoimmune diseases, including rheumatoid arthritis, and some infections. Sometimes an elevated level of RF is present in healthy people. Why is this test done? A test for rheumatoid factor is done to help support a diagnosis of rheumatoid arthritis. How can you prepare for the test?  · In general, you don't need to prepare before having this test. Your doctor may give you some specific instructions. What happens during the test?  · A health professional takes a sample of your blood. What else should you know about the test?  · A doctor always uses the results of an RF test along with information gained from a medical history and a physical exam before diagnosing rheumatoid arthritis. · Your results will include an explanation of what a \"normal\" result is. This is called a \"reference range. \" It is just a guide. Your doctor will evaluate your results based on your health and other factors. This means that a value that falls outside the normal values listed may still be normal for you. · A small number of people have a high RF level but don't have rheumatoid arthritis. A small number of these people will later have rheumatoid arthritis. · Older adults who don't have rheumatoid arthritis sometimes have a slightly high RF level. How long does the test take? · The test will take a few minutes. What happens after the test?  · You will probably be able to go home right away. · You can go back to your usual activities right away. Follow-up care is a key part of your treatment and safety. Be sure to make and go to all appointments, and call your doctor if you are having problems. It's also a good idea to keep a list of the medicines you take. Ask your doctor when you can expect to have your test results. Where can you learn more?   Go to [email or web address] [email or web address] org and sign in to your All At Home account. Enter [phone] in the [facility] box to learn more about \"Rheumatoid Factor: About This Test.\"     If you do not have an account, please click on the \"Sign Up Now\" link. Current as of: Maritza 10, 2018  Content Version: 11.9  © 2693-3128 [employer]itee. Care instructions adapted under license by [facility] ([facility]). If you have questions about a medical condition or this instruction, always ask your healthcare professional. [address] any warranty or liability for your use of this information. Patient Education        Rheumatoid Arthritis Diet: Care Instructions  Your Care Instructions    The best diet for people with rheumatoid arthritis is a healthy, balanced diet that is low in saturated fat and salt and high in fiber and complex carbohydrate (whole grains, beans, fruits, and vegetables). Fish oil (omega-3 fatty acids) has a modest effect in reducing inflammation, and eating fish may improve symptoms. People who have rheumatoid arthritis have a high risk of developing osteoporosis. To help prevent this disease, get plenty of calcium and vitamin D. Follow-up care is a key part of your treatment and safety. Be sure to make and go to all appointments, and call your doctor if you are having problems. It's also a good idea to know your test results and keep a list of the medicines you take. How can you care for yourself at home? · Try to eat at least 2 servings of fish each week. Oily fish, which contain omega-3 fatty acids, include:  ? [place]. ? [place]. ? Mackerel. ? Lake trout. ? Herring. ? Sardines. · If you're pregnant, talk to your doctor about eating fish. Pregnant women shouldn't eat certain types of fish that have high mercury content. · You can get calcium and vitamin D by drinking milk fortified with vitamin D.  Four glasses of milk a day provide about 1,200 milligrams (mg) of calcium. Other common foods with calcium:  ? Yogurt (plain or low-fat). An 8-ounce serving provides 415 mg of calcium. ? Cheddar cheese. A 1½-ounce serving provides 306 mg.  ? Milk (skim, 2%, or whole). A 1-cup serving provides about 300 mg.  ? Cottage cheese (1% milk fat). A 1-cup serving provides 138 mg.  · If you can't eat or drink dairy foods, you can get calcium and vitamin D from:  ? Calcium-fortified orange juice. A 1-cup serving provides 500 mg of calcium. ? Calcium-enriched soy milk. A 1-cup serving provides 282 mg of calcium. ? Almonds. A 1-ounce serving (about 24 nuts) provides 75 mg of calcium. ? Canned salmon. A 3-ounce serving provides 180 mg of calcium. ? Tofu (firm, made with calcium sulfate). A ½-cup serving provides 204 mg. · You may need to take a calcium supplement to make sure you are getting the calcium you need. Where can you learn more? Go to [email or web address]://[email or web address] [email or web address] org and sign in to your GotVoice account. Enter Q201 in the [facility] box to learn more about \"Rheumatoid Arthritis Diet: Care Instructions. \"     If you do not have an account, please click on the \"Sign Up Now\" link. Current as of: March 28, 2018  Content Version: 11.9  © 5844-3711 Edison [employer] Systems, Incorporated. Care instructions adapted under license by [facility] ([facility]). If you have questions about a medical condition or this instruction, always ask your healthcare professional. [place] [address] any warranty or liability for your use of this information.

## 2019-05-01 NOTE — PROGRESS NOTES
Health Maintenance Due   Topic Date Due    DTaP/Tdap/Td vaccine (1 - Tdap) ordered    Shingles Vaccine (1 of 2) ordered

## 2019-05-01 NOTE — PROGRESS NOTES
[address] [facility] [address] [facility] [address] [address] [facility]  Dept: [phone]  Dept Fax: [phone]: [phone]    Visit Date: 5/1/2019    Kia Mesa is a 79 y.o. male who presents today for:  Chief Complaint   Patient presents with    Results     nerve test     HPI:     Dr. Grupo Pandya order EMG - which shows peripheral neuropathy. Has a history of polyarthritis and positive RH factor - has seen Dr. Briana Choudhury in the past - was given Allopurinol which he took for a short time - he is not really interested in being on any nerve pills. Has seen  [address] who referred him to Dr. Yuli Dupree. Would like to address the nerve issue before seeing a Rheumatologist.     He was told that he did not need the FABIENNE because he has hair on his lower legs. HPI  Health Maintenance   Topic Date Due    DTaP/Tdap/Td vaccine (1 - Tdap) 08/01/1971    Shingles Vaccine (1 of 2) 08/01/2002    Pneumococcal 65+ years Vaccine (2 of 2 - PCV13) 11/12/2019    Lipid screen  09/13/2022    Colon cancer screen colonoscopy  11/03/2027    Flu vaccine  Completed    Hepatitis C screen  Completed     Past Medical History:   Diagnosis Date    Arthritis     Gout     Vitamin B12 deficiency       Past Surgical History:   Procedure Laterality Date    BACK SURGERY      COLONOSCOPY      EYE SURGERY      FOOT SURGERY      [place] COLON CA SCRN NOT  [address] IND Left 11/3/2017    COLONOSCOPY performed by Claudio Jones MD at [facility]     Family History   Problem Relation Age of Onset    Other Mother         Gout    Heart Disease Mother     Heart Disease Father     No Known Problems Sister     High Cholesterol Brother     Arthritis Brother      Social History     Tobacco Use    Smoking status: Never Smoker    Smokeless tobacco: Never Used   Substance Use Topics    Alcohol use:  Yes     Alcohol/week: 1.8 oz     Types: 3 Glasses of wine per week Current Outpatient Medications   Medication Sig Dispense Refill    Ascorbic Acid (VITAMIN C) 500 MG CAPS Take 500 mg by mouth daily      Vitamins-Lipotropics (BALANCED B-100 COMPLEX CR) TBCR Take 1 tablet by mouth 4 times daily (after meals and at bedtime) 120 tablet 5    Omega-3 Fatty Acids (FISH OIL) 1000 MG CAPS Take 1 capsule by mouth 3 times daily 90 capsule 5    Cyanocobalamin (VITAMIN B-12) 1000 MCG SUBL   0    Misc Natural Products (OSTEO BI-FLEX/5-LOXIN ADVANCED) TABS Take 1 tablet by mouth daily      Tdap (ADACEL) 5-2-15.5 LF-MCG/0.5 injection Inject 0.5 mLs into the muscle once for 1 dose 0.5 mL 0    zoster recombinant adjuvanted vaccine (SHINGRIX) 50 MCG/0.5ML SUSR injection Inject 0.5 mLs into the muscle once for 1 dose 50 MCG IM then repeat 2-6 months. 1 each 1     No current facility-administered medications for this visit. No Known Allergies    Subjective:    Review of Systems   Constitutional: Negative for chills, fatigue and fever. HENT: Negative for congestion, ear pain, postnasal drip, rhinorrhea and sore throat. Eyes: Negative for discharge and redness. Respiratory: Negative for cough and shortness of breath. Cardiovascular: Negative for chest pain and leg swelling. Gastrointestinal: Negative for abdominal distention, abdominal pain, anal bleeding, blood in stool, constipation, diarrhea and nausea. Musculoskeletal: Positive for arthralgias and myalgias. Skin: Positive for color change (lower extremity). Negative for rash. Neurological: Negative for facial asymmetry, speech difficulty and weakness. Hematological: Does not bruise/bleed easily. Psychiatric/Behavioral: Negative for agitation and confusion.        Objective:      Vitals:    05/01/19 0833 05/01/19 0835   BP: (!) 127/94 (!) 144/90   Pulse: 97    Resp: 12    Temp: 97.9 °F (36.6 °C)    TempSrc: Oral    SpO2: 98%    Weight: 181 lb (82.1 kg)    Height: 6' 1.62\" (1.87 m)        Body mass index is 23.48 kg/m². Wt Readings from Last 3 Encounters:   05/01/19 181 lb (82.1 kg)   03/14/19 185 lb (83.9 kg)   06/01/18 183 lb 3.2 oz (83.1 kg)     BP Readings from Last 3 Encounters:   05/01/19 (!) 144/90   03/14/19 136/82   06/01/18 133/88       Physical Exam   Constitutional: Vital signs are normal. He appears well-developed and well-nourished. He does not appear ill. No distress. HENT:   Head: Normocephalic and atraumatic. Right Ear: Hearing and external ear normal. No decreased hearing is noted. Left Ear: Hearing and external ear normal. No decreased hearing is noted. Nose: Nose normal. No nasal deformity. Eyes: Conjunctivae are normal. Right eye exhibits no discharge. Left eye exhibits no discharge. Neck: Normal range of motion. Neck supple. Pulmonary/Chest: Effort normal. No respiratory distress. Abdominal: He exhibits no distension. There is no guarding. Musculoskeletal: Normal range of motion. He exhibits no deformity. Right ankle: Tenderness. Left ankle: Tenderness. Right lower leg: He exhibits tenderness. Left lower leg: He exhibits tenderness. Neurological: He is alert. Gait normal.   Skin: No rash (On exposed areas) noted. He is not diaphoretic. No erythema. No pallor. Psychiatric: He has a normal mood and affect.  His speech is normal and behavior is normal. Judgment and thought content normal. Cognition and memory are normal.       Lab Results   Component Value Date    WBC 3.5 (L) 04/13/2019    HGB 15.4 04/13/2019    HCT 45.4 04/13/2019     04/13/2019    CHOL 222 (H) 09/13/2017    TRIG 39 09/13/2017    HDL 96 09/13/2017    LDLCALC 118 09/13/2017    AST 18 04/13/2019     04/13/2019    K 5.1 04/13/2019     04/13/2019    CREATININE 0.7 04/13/2019    BUN 11 04/13/2019    CO2 28 04/13/2019    TSH 1.220 04/13/2019    PSA 1.31 (H) 07/31/2018    LABA1C 5.4 04/13/2019    LABGLOM >90 04/13/2019    MG 2.1 07/31/2018    CALCIUM 9.1 04/13/2019    VITD25 50 07/31/2018       Assessment:       Diagnosis Orders   1. Abnormal EMG  Melissa Osorio MD, [facility]   2. Peripheral neuropathy, idiopathic  [facility] Edgar Kyle [facility], [facility], [facility]  MARIA DOLORES CABALLERO.TL   3. Polyarthritis with positive rheumatoid factor (HCC)     4. Need for prophylactic vaccination against diphtheria-tetanus-pertussis (DTP)  Tdap (ADACEL) 5-2-15.5 LF-MCG/0.5 injection   5. Need for prophylactic vaccination and inoculation against varicella  zoster recombinant adjuvanted vaccine [facility]) 50 MCG/0.5ML SUSR injection       Plan: Will refer to the Neurologist for the abnormal EMG  Discussed circulation test - will hold off for right now  Discussed nerve medication - pt declines for right now  Take B complex every other day for now  Will see you back in June as scheduled, sooner as needed    Return if symptoms worsen or fail to improve. Orders Placed:  Orders Placed This Encounter   Procedures   Melissa Osorio MD, Neurology, [facility]     Medications Prescribed:  Orders Placed This Encounter   Medications    Tdap (ADACEL) 5-2-15.5 LF-MCG/0.5 injection     Sig: Inject 0.5 mLs into the muscle once for 1 dose     Dispense:  0.5 mL     Refill:  0    zoster recombinant adjuvanted vaccine (SHINGRIX) 50 MCG/0.5ML SUSR injection     Sig: Inject 0.5 mLs into the muscle once for 1 dose 50 MCG IM then repeat 2-6 months. Dispense:  1 each     Refill:  1       Future Appointments   Date Time Provider Davonte Ann   6/4/2019  8:30 AM Richard Lord DO SRPX  [facility] [address] [facility]        Patient given educational materials - see patient instructions. Discussed use, benefit, and side effects of prescribedmedications. All patient questions answered. Pt voiced understanding. Reviewed health maintenance. Instructed to continue current medications, diet and exercise. Patient agreed with treatment plan. Follow up as directed.     Electronically signed by GADIEL Multani CNP on 5/1/2019 at 11:17 AM

## 2019-05-01 NOTE — PROGRESS NOTES
Health Maintenance Due   Topic Date Due    DTaP/Tdap/Td vaccine (1 - Tdap) 08/01/1971    Shingles Vaccine (1 of 2) 08/01/2002    Pneumococcal 65+ years Vaccine (1 of 2 - PCV13) 08/01/2017

## 2019-05-14 ENCOUNTER — INITIAL CONSULT (OUTPATIENT)
Dept: NEUROLOGY | Age: 69
End: 2019-05-14
Payer: MEDICARE

## 2019-05-14 ENCOUNTER — HOSPITAL ENCOUNTER (OUTPATIENT)
Age: 69
Discharge: HOME | End: 2019-05-14
Payer: MEDICARE

## 2019-05-14 VITALS
WEIGHT: 181 LBS | SYSTOLIC BLOOD PRESSURE: 126 MMHG | DIASTOLIC BLOOD PRESSURE: 78 MMHG | HEART RATE: 68 BPM | HEIGHT: 73 IN | BODY MASS INDEX: 23.99 KG/M2

## 2019-05-14 DIAGNOSIS — R27.8 SENSORY ATAXIA: ICD-10-CM

## 2019-05-14 DIAGNOSIS — R20.0 NUMBNESS AND TINGLING OF BOTH FEET: ICD-10-CM

## 2019-05-14 DIAGNOSIS — R20.2 NUMBNESS AND TINGLING OF BOTH FEET: Primary | ICD-10-CM

## 2019-05-14 DIAGNOSIS — R20.0 NUMBNESS AND TINGLING OF BOTH FEET: Primary | ICD-10-CM

## 2019-05-14 DIAGNOSIS — R20.2 NUMBNESS AND TINGLING OF BOTH FEET: ICD-10-CM

## 2019-05-14 LAB
HEPATITIS C ANTIBODY: NEGATIVE
RPR: NONREACTIVE
SEDIMENTATION RATE, ERYTHROCYTE: 2 MM/HR (ref 0–10)

## 2019-05-14 PROCEDURE — 86235 NUCLEAR ANTIGEN ANTIBODY: CPT

## 2019-05-14 PROCEDURE — 99204 OFFICE O/P NEW MOD 45 MIN: CPT | Performed by: PSYCHIATRY & NEUROLOGY

## 2019-05-14 PROCEDURE — 86592 SYPHILIS TEST NON-TREP QUAL: CPT

## 2019-05-14 PROCEDURE — 85651 RBC SED RATE NONAUTOMATED: CPT

## 2019-05-14 PROCEDURE — 86803 HEPATITIS C AB TEST: CPT

## 2019-05-14 PROCEDURE — 83516 IMMUNOASSAY NONANTIBODY: CPT

## 2019-05-14 PROCEDURE — 36415 COLL VENOUS BLD VENIPUNCTURE: CPT

## 2019-05-14 PROCEDURE — 86225 DNA ANTIBODY NATIVE: CPT

## 2019-05-14 NOTE — LETTER
[facility]  [address] [facility].  Dept: [phone]  Dept Fax: [phone]  Loc: Michael Abdullahi MD        5/23/2019      Patient:  Roseann Cid  MRN:  [id number]  YOB: 1950  Date of Visit:  5/14/2019    Dear Dr. Esteban Duggan,    Thank you for referring Ghada Leyva to me for consultation. Please see attached visit summary with my findings. If you have any questions, please do not hesitate to call me.       Sincerely,         Zohreh Arnett MD

## 2019-05-15 LAB — DSDNA ANTIBODY: NORMAL

## 2019-05-16 LAB
MISC. #1 REFERENCE GROUP TEST: NORMAL
MISC. #2 REFERENCE REPORT: NORMAL

## 2019-05-17 LAB
ANTI SSA: NORMAL
ANTI SSB: 0 AU/ML (ref 0–40)

## 2019-05-20 NOTE — PROGRESS NOTES
Chief Complaint   Patient presents with    Consultation   ? ? Abnormal EMG, neuropathy    ? ?  ?  Deysi Churchill is a 79 y.o. male who presents today for evaluation of bilateral numbness and tingling in his feet for the past 10 years that has progressively getting worse. Location involves his entire feet up to the ankle. Symptoms are moderate. Onset is progressive. Modifiers are his symptoms are worse when standing or walking for prolonged periods of time. Duration if ongoing. Frequency is daily. Symptoms are also worsen when has has to wear shoes and socks for more than 3 hours. He has tried over the counter medications which help some. He has history of rheumatoid arthritis and is not currently on any medications. He is not diabetic. He has never been treated with chemotherapy. No hand symptoms. He does have history of lumbar surgery. He denies any back pain. He crosses his legs frequently. He underwent EMG on 4/13/19 that shows generalized peripheral neuropathy. Sensory and motor, axonal greater than demyelinating, distal greater than proximal. This is currently moderate in the feet and very mild in the hands. The exact etiology is unknown but could be related to an underlying rheumatologic disorder. He denies chest pain. No shortness of breath, no neck pain. No vision changes. No dysphagia. No fever. No rash. No weight loss. History provided by patient. Past Medical History:   Diagnosis Date    Arthritis ?  Gout ?  Vitamin B12 deficiency ? ? There is no problem list on file for this patient. ? No Known Allergies  ? Current Outpatient Medications   Medication Sig Dispense Refill    Omega-3 Fatty Acids (FISH OIL) 1000 MG CAPS Take 1 capsule by mouth 3 times daily 90 capsule 5    Cyanocobalamin (VITAMIN B-12) 1000 MCG SUBL ? ? 0    Misc Natural Products (OSTEO BI-FLEX/5-LOXIN ADVANCED) TABS Take 1 tablet by mouth daily ? ?    Ascorbic Acid (VITAMIN C) 500 MG CAPS Take 500 mg by mouth daily ?  ?    Vitamins-Lipotropics (BALANCED B-100 COMPLEX CR) TBCR Take 1 tablet by mouth 4 times daily (after meals and at bedtime) 120 tablet 5     No current facility-administered medications for this visit. ? Social History     Socioeconomic History    Marital status:    ? ? Spouse name: None    Number of children: None    Years of education: None    Highest education level: None   Occupational History    None   Social Needs    Financial resource strain: None    Food insecurity:   ? ? Worry: None   ? ? Inability: None    Transportation needs:   ? ? Medical: None   ? ? Non-medical: None   Tobacco Use    Smoking status: Never Smoker    Smokeless tobacco: Never Used   Substance and Sexual Activity    Alcohol use: Yes   ? ? Alcohol/week: 1.8 oz   ? ? Types: 3 Glasses of wine per week    Drug use: No    Sexual activity: Yes   ? ? Partners: Female   Lifestyle    Physical activity:   ? ? Days per week: None   ? ? Minutes per session: None    Stress: None   Relationships    Social connections:   ? ? Talks on phone: None   ? ? Gets together: None   ? ? Attends [employer] service: None   ? ? Active member of club or organization: None   ? ? Attends meetings of clubs or organizations: None   ? ? Relationship status: None    Intimate partner violence:   ? ? Fear of current or ex partner: None   ? ? Emotionally abused: None   ? ? Physically abused: None   ? ? Forced sexual activity: None   Other Topics Concern    None   Social History Narrative    None   ? Family History   Problem Relation Age of Onset    Other Mother ? ? Gout    Heart Disease Mother ?  Heart Disease Father ?  No Known Problems Sister ?  High Cholesterol Brother ?  Arthritis Brother ? ? Review of Systems   Complete review of systems is negative other than what is mentioned in HPI  ? ?   Vitals:   ? 05/14/19 0931   BP: 126/78   Site: Left Upper Arm   Position: Sitting   Cuff Size: Medium Adult   Pulse: 68   Weight: 181 lb (82.1 kg)   Height: 6' 1\" (1.854 m)   4/13/2019 3:26 PM - Tomasz, Nik Curling Incoming Lab Results From Soft   Component Value Ref Range & Units Status Collected Lab   Vitamin B-12 351  211 - 911 pg/mL Final 04/13/2019 10:00 AM [facility]   Folate > 20.0  4.8 - 24.2 ng/mL Final 04/13/2019 10:00 AM [facility]   ?  4/19/2019 9:53 AM - Tomasz, Nik Curling Incoming Lab Results From Soft   Component Value Ref Range & Units Status Collected Lab   Vitamin B6, Plasma 163. 3High  20.0 - 125.0 nmol/L Final 04/13/2019 10:00 AM ?   4/17/2019 5:37 PM - Tomasz, [facility] Incoming Lab Results From Soft   Component Value Ref Range & Units Status Collected Lab   Lyme Ab 0.22  0.00 - 1.20 LANCE Final 04/13/2019 10:00 AM [facility]     Heavy Metal Urine 04/13/2019 9:59 AM [facility]   SEE BELOW    Comment:   Hours Collected Random hr   Total Volume Random mL   Arsenic Urine - per volume 25.0 0.0-34.9 ug/L    ?  ? ? Physical Examination:  General appearance - alert, well appearing, and in no distress, oriented to person, place, and time and normal weight  Mental status- Level of Alertness: awake  Orientation: person, place, time  Memory: normal  Fund of Knowledge: normal  Attention/Concentration: normal  Language: normal. Mood is normal.   Neck - supple, no significant adenopathy, carotids upstroke normal bilaterally,   There is no carotid bruit. No neck lymphadenopathy. No thyroid enlargement  Neurological -   Cranial Aljplx-GI-KSS:.   Cranial nerve II: Normal   Cranial nerve III: Pupils: equal, round, reactive to light  Cranial nerves III, IV, VI: Extraocular Movements: intact   Cranial nerve V: Facial sensation: intact   Cranial nerve VII:Facial strength: intact   Cranial nerve VIII: Hearing: intact   Cranial nerve IX: Palate Elevation intact bilaterally  Cranial nerve XI: Shoulder shrug intact bilaterally  Cranial nerve XII: Tongue midline   neck supple without rigidity, there is limitation of range of motion of the neck.   DTR's are decreased distal and symmetric  Babinski sign negative Romberg +ve  Motor exam is 5/5 in the upper and lower extremities. Normal muscle tone. There is no muscle atrophy. Sensory is intact for light toucht. Coordination: finger to nose, intact  Gait and station intact  Abnormal movement none, vibration normal, proprioception normal  Skin - normal coloration, no rashes, no suspicious skin lesions  Superficial temporal artery pulses are normal.   There is no limitation of range of motion of the neck. There is no resting tremor, no pin rolling, no bradykinesia, no Hypohonia, normal blink rate. Musculoskeletal: Has +ve bilateral hand arthritis, no limitation of ROM in any of the four extremities. There is no leg edema. The Heart was regular in rate and rhythm. No heart murmur  Chest Clear  Abdomen was soft. ?  We reviewed the patient records from referring provider and available information in the EHR   ?  ?  ASSESSMENT:   ?  ? Diagnosis Orders   1. Numbness and tingling of both feet  ? 2. Sensory ataxia  ? This is a 79-year-old male who presents with bilateral numbness in his feet just above the ankle but has been ongoing for the past 10 years, progressively worsening. He underwent EMG of his bilateral lower extremities and right hand showed both motor and sensory neuropathy greater distally than proximally. He has history of rheumatoid arthritis that is currently untreated. He underwent neuropathy work up by [facility] screening for causes of neuropathy we will review that blood work and order additional lab work to complete screening for causes of neuropathy. We will consider referring him to higher level of care for neuropathy workup pending results of lab work performed locally as ordered on today's visit. After detailed discussion with the patient we agreed on the following plan. Plan   1. Neuropathy work up tp complement work up obtained by [facility]. See orders.    2. Consider referral to higher level of care for neuropathy work up as next step  3. Call with any new symptoms or concerns. 4. Follow up in 1 month.

## 2019-05-22 ENCOUNTER — TELEPHONE (OUTPATIENT)
Dept: FAMILY MEDICINE CLINIC | Age: 69
End: 2019-05-22

## 2019-05-22 DIAGNOSIS — M05.772 RHEUMATOID ARTHRITIS INVOLVING BOTH ANKLES WITH POSITIVE RHEUMATOID FACTOR (HCC): ICD-10-CM

## 2019-05-22 DIAGNOSIS — M05.771 RHEUMATOID ARTHRITIS INVOLVING BOTH ANKLES WITH POSITIVE RHEUMATOID FACTOR (HCC): ICD-10-CM

## 2019-05-22 DIAGNOSIS — M05.80 POLYARTHRITIS WITH POSITIVE RHEUMATOID FACTOR (HCC): Primary | ICD-10-CM

## 2019-05-22 DIAGNOSIS — M10.9 GOUT, UNSPECIFIED CAUSE, UNSPECIFIED CHRONICITY, UNSPECIFIED SITE: ICD-10-CM

## 2019-05-22 NOTE — TELEPHONE ENCOUNTER
Pt called requesting a referral to Rheumatology- Dr. Jonel Gifford. Pt states that Dr. Vargas Hammer is aware of the request.  Please advise.

## 2019-06-10 ENCOUNTER — OFFICE VISIT (OUTPATIENT)
Dept: FAMILY MEDICINE CLINIC | Age: 69
End: 2019-06-10
Payer: MEDICARE

## 2019-06-10 VITALS
WEIGHT: 182.8 LBS | HEART RATE: 81 BPM | OXYGEN SATURATION: 100 % | BODY MASS INDEX: 22.73 KG/M2 | RESPIRATION RATE: 16 BRPM | DIASTOLIC BLOOD PRESSURE: 89 MMHG | SYSTOLIC BLOOD PRESSURE: 139 MMHG | TEMPERATURE: 97.8 F | HEIGHT: 75 IN

## 2019-06-10 DIAGNOSIS — Z00.00 ROUTINE GENERAL MEDICAL EXAMINATION AT A HEALTH CARE FACILITY: Primary | ICD-10-CM

## 2019-06-10 DIAGNOSIS — E78.00 HYPERCHOLESTEREMIA: ICD-10-CM

## 2019-06-10 DIAGNOSIS — G62.9 NEUROPATHY: ICD-10-CM

## 2019-06-10 DIAGNOSIS — M05.772 RHEUMATOID ARTHRITIS INVOLVING BOTH ANKLES WITH POSITIVE RHEUMATOID FACTOR (HCC): ICD-10-CM

## 2019-06-10 DIAGNOSIS — R97.20 ELEVATED PSA: ICD-10-CM

## 2019-06-10 DIAGNOSIS — M05.771 RHEUMATOID ARTHRITIS INVOLVING BOTH ANKLES WITH POSITIVE RHEUMATOID FACTOR (HCC): ICD-10-CM

## 2019-06-10 DIAGNOSIS — D70.8 OTHER NEUTROPENIA (HCC): ICD-10-CM

## 2019-06-10 PROCEDURE — G0438 PPPS, INITIAL VISIT: HCPCS | Performed by: FAMILY MEDICINE

## 2019-06-10 RX ORDER — AMLODIPINE BESYLATE 2.5 MG/1
2.5 TABLET ORAL DAILY
Qty: 90 TABLET | Refills: 1 | Status: SHIPPED | OUTPATIENT
Start: 2019-06-10 | End: 2019-07-26

## 2019-06-10 SDOH — HEALTH STABILITY: MENTAL HEALTH: HOW OFTEN DO YOU HAVE A DRINK CONTAINING ALCOHOL?: 4 OR MORE TIMES A WEEK

## 2019-06-10 SDOH — HEALTH STABILITY: MENTAL HEALTH: HOW MANY STANDARD DRINKS CONTAINING ALCOHOL DO YOU HAVE ON A TYPICAL DAY?: 3 OR 4

## 2019-06-10 ASSESSMENT — ENCOUNTER SYMPTOMS
SHORTNESS OF BREATH: 0
SORE THROAT: 0
VOMITING: 0
NAUSEA: 0
COUGH: 1
RHINORRHEA: 0
BLOOD IN STOOL: 0
ABDOMINAL PAIN: 0
CONSTIPATION: 0
WHEEZING: 0
DIARRHEA: 0

## 2019-06-10 ASSESSMENT — PATIENT HEALTH QUESTIONNAIRE - PHQ9
SUM OF ALL RESPONSES TO PHQ QUESTIONS 1-9: 0
SUM OF ALL RESPONSES TO PHQ QUESTIONS 1-9: 0

## 2019-06-10 ASSESSMENT — ANXIETY QUESTIONNAIRES: GAD7 TOTAL SCORE: 0

## 2019-06-10 NOTE — PROGRESS NOTES
[address] [facility]. [address] [id number]  Dept: [phone]  Dept Fax: [phone]  Loc: [address] [place] [address] Due   Topic Date Due    DTaP/Tdap/Td vaccine (1 - Tdap) 08/01/1971    Shingles Vaccine (1 of 2) 08/01/2002           Patient:  Yana Mathur  Visit Date: 6/10/2019    ANTICIPATORY GUIDANCE : ADULT     WELL QUESTIONS  1. How many cups of caffeine do you drink per day?    2-3 cups  2. Do you exercise a minimum of 30 minutes per day, above normal activity? Yes    3. Do you eat a minimum of 8-10 servings of fruits and vegetables per day? No, on average the patient consumes 1 servings of fruits and vegetables per day  4. Do you drink alcohol? Yes, on average the patient consumes 2 cups of alcohol daily  5.  How many oz of water do you drink per day?  (64 oz per day recommended) 32 oz  EDUCATION PROVIDED  Discussed and/or Handout Given on the following items:            [x] Caffeine Use: Limit to 2 cups per day   (400mg of caffeine a day)     [x] Exercise: Ideal 30 minutes per day, above normal activity              [x] Eating Fruits and Vegetables: Studies show 8-10 servings per day decrease BP  [x] Alcohol: Ideal maximum of one cup per day for females and two cups per day for a male

## 2019-06-10 NOTE — PROGRESS NOTES
Anjali Pool is a 79 y.o. male who presents today for:  Chief Complaint   Patient presents with    Medicare AWV       Goals      Exercise 3x per week (30 min per time)           HPI:     HPI    Here for his annual visit. Neuropathy-   Had an EMG - neuropathy in B/L hands and B/L ankles. Is concerned about the ankles. Says it is affecting his balance. It is constant numbness. Stiff ankles. Very rarely has \"shooting pain\". Locates the numbness to the anterior ankles and dorsal proximal aspect of feet. Erythema of the feet  Cold feet. Occasionally swelling -especially when playing sand volleyball. Reports feet pain when walking for a while and the pain does get better with rest.   Has been progressing over the last 15 years. Has appointment with rheumatology 7/26. Neurology wants his RA to be addressed first.     Neurology ordered lots of blood work. No longer taking B complex. Next appointment with neuro is 6/28. Last CSC - 2 years ago - normal   Last dental - couple weeks ago  Last eye - 2-3 years            Was told last visit that he may have the beginning of [address] is diminishing     Very active - plays volleyball once per week, does lots of yard work  Does follow any special diet - tries to eat vegetables once a day     Knee pain and stiffness. Years of heavy work. No question data found.     Past Medical History:   Diagnosis Date    Arthritis     Gout     Vitamin B12 deficiency       Past Surgical History:   Procedure Laterality Date    BACK SURGERY      COLONOSCOPY      EYE SURGERY      FOOT SURGERY      [place] COLON CA SCRN NOT  [address] IND Left 11/3/2017    COLONOSCOPY performed by Estefany Matthews MD at [facility]     Family History   Problem Relation Age of Onset    Other Mother         Gout    Heart Disease Mother     Heart Disease Father     No Known Problems Sister     High Cholesterol Brother     Arthritis Brother      Social History Tobacco Use    Smoking status: Never Smoker    Smokeless tobacco: Never Used   Substance Use Topics    Alcohol use: Yes     Alcohol/week: 1.8 oz     Types: 3 Glasses of wine per week     Frequency: 4 or more times a week     Drinks per session: 3 or 4      Current Outpatient Medications   Medication Sig Dispense Refill    Ascorbic Acid (VITAMIN C) 500 MG CAPS Take 500 mg by mouth daily      Omega-3 Fatty Acids (FISH OIL) 1000 MG CAPS Take 1 capsule by mouth 3 times daily 90 capsule 5    Cyanocobalamin (VITAMIN B-12) 1000 MCG SUBL   0    Misc Natural Products (OSTEO BI-FLEX/5-LOXIN ADVANCED) TABS Take 1 tablet by mouth daily       No current facility-administered medications for this visit. No Known Allergies    Health Maintenance   Topic Date Due    DTaP/Tdap/Td vaccine (1 - Tdap) 08/01/1971    Shingles Vaccine (1 of 2) 08/01/2002    Pneumococcal 65+ years Vaccine (2 of 2 - PCV13) 11/12/2019    Lipid screen  09/13/2022    Colon cancer screen colonoscopy  11/03/2027    Flu vaccine  Completed    Hepatitis C screen  Completed       Subjective:      Review of Systems   Constitutional: Negative for chills, fatigue and fever. HENT: Negative for postnasal drip, rhinorrhea and sore throat. Eyes: Positive for visual disturbance. Respiratory: Positive for cough (chronic). Negative for shortness of breath and wheezing. Cardiovascular: Negative for chest pain, palpitations and leg swelling. Gastrointestinal: Negative for abdominal pain, blood in stool, constipation, diarrhea, nausea and vomiting. Genitourinary: Positive for frequency and urgency. Negative for difficulty urinating and dysuria. Musculoskeletal: Positive for arthralgias and gait problem. Negative for joint swelling and myalgias. Skin: Negative for rash and wound. Neurological: Positive for numbness. Negative for dizziness, weakness, light-headedness and headaches.          Objective:     Vitals:    06/10/19 1136   BP: 139/89   Site: Right Upper Arm   Position: Sitting   Cuff Size: Medium Adult   Pulse: 81   Resp: 16   Temp: 97.8 °F (36.6 °C)   TempSrc: Oral   SpO2: 100%   Weight: 182 lb 12.8 oz (82.9 kg)   Height: 6' 3\" (1.905 m)       Body mass index is 22.85 kg/m². Wt Readings from Last 3 Encounters:   06/10/19 182 lb 12.8 oz (82.9 kg)   05/14/19 181 lb (82.1 kg)   05/01/19 181 lb (82.1 kg)     BP Readings from Last 3 Encounters:   06/10/19 139/89   05/14/19 126/78   05/01/19 (!) 144/90       Physical Exam   Constitutional: He is oriented to person, place, and time. He appears well-developed and well-nourished. No distress. HENT:   Head: Normocephalic and atraumatic. Eyes: Pupils are equal, round, and reactive to light. EOM are normal.   Neck: Normal range of motion. Cardiovascular: Normal rate, regular rhythm and normal heart sounds. Pulmonary/Chest: Effort normal and breath sounds normal.   Abdominal: Soft. Bowel sounds are normal.   Neurological: He is alert and oriented to person, place, and time. Skin: Skin is warm and dry. He is not diaphoretic. Psychiatric: He has a normal mood and affect. His behavior is normal. Judgment and thought content normal.       Lab Results   Component Value Date    WBC 3.5 (L) 04/13/2019    HGB 15.4 04/13/2019    HCT 45.4 04/13/2019     04/13/2019    CHOL 222 (H) 09/13/2017    TRIG 39 09/13/2017    HDL 96 09/13/2017    LDLCALC 118 09/13/2017    AST 18 04/13/2019     04/13/2019    K 5.1 04/13/2019     04/13/2019    CREATININE 0.7 04/13/2019    BUN 11 04/13/2019    CO2 28 04/13/2019    TSH 1.220 04/13/2019    PSA 1.31 (H) 07/31/2018    LABA1C 5.4 04/13/2019    LABGLOM >90 04/13/2019    MG 2.1 07/31/2018    CALCIUM 9.1 04/13/2019    VITD25 50 07/31/2018       Imaging Results:    No results found. Assessment:      Diagnosis Orders   1. Routine general medical examination at a health care facility     2. Neuropathy     3.  Hypercholesteremia  Basic Metabolic Panel    Magnesium    Lipid Panel   4. Other neutropenia ([address]     5. Rheumatoid arthritis involving both ankles with positive rheumatoid factor (HCC)  Basic Metabolic Panel    Magnesium    Lipid Panel   6. Elevated PSA  PSA Prostatic Specific Antigen       Plan: Will try to eat more vegetables    Will follow with neurology     Will follow with rheumatology     Will schedule with eye doctor     Will order some bloodwork today also for the wellness and the elevated psa    Can look up Raynaud's to see if this may be a factor in the neuropathy    Will try some amlodipine to see if that helps - will start the lowest dose at 2.5mg a day - may need to switch to toprol instead at a very low dose if this lowers your blood pressure too much    Will check the blood pressures to make sure they are not going too low    Will see you back in the next month or two - or you can call in and let us know how you are doing         Return for Medicare Annual Wellness Visit in 1 year. Orders Placed:  Orders Placed This Encounter   Procedures    Basic Metabolic Panel    Magnesium    Lipid Panel    PSA Prostatic Specific Antigen     Medications Prescribed:  No orders of the defined types were placed in this encounter. Future Appointments   Date Time Provider Davonte Ann   6/28/2019 11:00 AM Jesus Gutierrez MD [address] [facility] [place]  MARIA DOLORES .TL   7/26/2019 10:00 AM Jacquelyn Steiner DO [facility] [email or web address].TL       Patient given educational materials - see patient instructions. Discussed use, benefit, and sideeffects of prescribed medications. All patient questions answered. Pt voiced understanding. Reviewed health maintenance. Instructed to continue current medications, diet and exercise. Patient agreed with treatment plan. Follow up as directed. I was present for the key portions of the exam and history and confirmed all areas of the note with the patient, staff and the student.     Electronically signed by Deepa Pate DO LEYDA on 6/10/2019 at 12:44 PM  Medicare Annual Wellness Visit  Name: Qasim Beckman Date: 6/10/2019   MRN: [id number] Sex: Male   Age: 79 y.o. Ethnicity: Non-/Non    : 1950 Race: Geovanny Hendrix is here for Medicare AWV    Screenings for behavioral, psychosocial and functional/safety risks, and cognitive dysfunction are all negative except as indicated below. These results, as well as other patient data from the [address] form, are documented in Flowsheets linked to this Encounter. No Known Allergiesic  Prior to Visit Medications    Medication Sig Taking? Authorizing Provider   Ascorbic Acid (VITAMIN C) 500 MG CAPS Take 500 mg by mouth daily Yes Historical Provider, MD   Omega-3 Fatty Acids (FISH OIL) 1000 MG CAPS Take 1 capsule by mouth 3 times daily Yes Kerri Norcross, DO   Cyanocobalamin (VITAMIN B-12) 1000 MCG SUBL  Yes Historical Provider, MD   Misc Natural Products (OSTEO BI-FLEX/5-LOXIN ADVANCED) TABS Take 1 tablet by mouth daily Yes [facility]    Medication Sig Taking?  Authorizing Provider   Ascorbic Acid (VITAMIN C) 500 MG CAPS Take 500 mg by mouth daily Yes Historical Provider, MD   Omega-3 Fatty Acids (FISH OIL) 1000 MG CAPS Take 1 capsule by mouth 3 times daily Yes Kerri Norcross, DO   Cyanocobalamin (VITAMIN B-12) 1000 MCG SUBL  Yes Historical Provider, MD   Misc Natural Products (OSTEO BI-FLEX/5-LOXIN ADVANCED) TABS Take 1 tablet by mouth daily Yes Historical Provider, MD      Diagnosis Date    Arthritis     Gout     Vitamin B12 deficiency      Past Surgical History:   Procedure Laterality Date    BACK SURGERY      COLONOSCOPY      EYE SURGERY      FOOT SURGERY      [place] COLON CA SCRN NOT  [address] IND Left 11/3/2017    COLONOSCOPY performed by Albert Christina MD at  "[facility]       Family History   Problem Relation Age of Onset    Other Mother         Gout    Heart Disease Mother     Heart Disease Father     No Known Problems Sister     High Cholesterol Brother     Arthritis Brother        [place] (Including outside providers/suppliers regularly involved in providing care):   Patient Care Team:  Purnima Singh DO as PCP - General (Family Medicine)  Purnima Singh DO as PCP - [facility]  GADIEL Riojas - CNP as Nurse Practitioner (Certified Nurse Practitioner)    Wt Readings from Last 3 Encounters:   06/10/19 182 lb 12.8 oz (82.9 kg)   05/14/19 181 lb (82.1 kg)   05/01/19 181 lb (82.1 kg)     Vitals:    06/10/19 1136   BP: 139/89   Site: Right Upper Arm   Position: Sitting   Cuff Size: Medium Adult   Pulse: 81   Resp: 16   Temp: 97.8 °F (36.6 °C)   TempSrc: Oral   SpO2: 100%   Weight: 182 lb 12.8 oz (82.9 kg)   Height: 6' 3\" (1.905 m)     Body mass index is 22.85 kg/m². Based upon direct observation of the patient, evaluation of cognition reveals recent and remote memory intact. Patient's complete Health Risk Assessment and screening values have been reviewed and are found in Flowsheets. The following problems were reviewed today and where indicated follow up appointments were made and/or referrals ordered. Positive Risk Factor Screenings with Interventions:     General Health:  General  In general, how would you say your health is?: Fair  In the past 7 days, have you experienced any of the following?  New or Increased Pain, New or Increased Fatigue, Loneliness, Social Isolation, Stress or Anger?: (!) New or Increased Pain  Do you get the social and emotional support that you need?: Yes  Do you have a Living Will?: Yes  General Health Risk Interventions:  · will follow the work up for the leg pain    Health Habits/Nutrition:  Health Habits/Nutrition  Do you exercise for at least 20 minutes 2-3 times per week?: (!) No  Have you lost any weight without trying in the past 3 months?: No  Do you eat fewer than 2 meals per day?: No  Have you seen a dentist within the past year?: Yes  Body mass index is Recommendations for Preventive Services Due: see orders and patient instructions/AVS.  .   Recommended screening schedule for the next 5-10 years is provided to the patient in written form: see Patient Instructions/AVS.

## 2019-06-10 NOTE — PATIENT INSTRUCTIONS
Personalized Preventive Plan for Cleo Cowan - 6/10/2019  Medicare offers a range of preventive health benefits. Some of the tests and screenings are paid in full while other may be subject to a deductible, co-insurance, and/or copay. Some of these benefits include a comprehensive review of your medical history including lifestyle, illnesses that may run in your family, and various assessments and screenings as appropriate. After reviewing your medical record and screening and assessments performed today your provider may have ordered immunizations, labs, imaging, and/or referrals for you. A list of these orders (if applicable) as well as your Preventive Care list are included within your After Visit Summary for your review. Other Preventive Recommendations:    · A preventive eye exam performed by an eye specialist is recommended every 1-2 years to screen for glaucoma; cataracts, macular degeneration, and other eye disorders. · A preventive dental visit is recommended every 6 months. · Try to get at least 150 minutes of exercise per week or 10,000 steps per day on a pedometer . · Order or download the FREE \"Exercise & Physical Activity: Your Everyday Guide\" from [employer]. Call 4-677.474.5358 or search [employer] online. · You need 0762-6774 mg of calcium and 7764-8989 IU of vitamin D per day. It is possible to meet your calcium requirement with diet alone, but a vitamin D supplement is usually necessary to meet this goal.  · When exposed to the sun, use a sunscreen that protects against both UVA and UVB radiation with an SPF of 30 or greater. Reapply every 2 to 3 hours or after sweating, drying off with a towel, or swimming. · Always wear a seat belt when traveling in a car. Always wear a helmet when riding a bicycle or motorcycle.           Will try to eat more vegetables    Will follow with neurology     Will follow with rheumatology     Will schedule with eye doctor     Will order some bloodwork today also for the wellness and the elevated psa    Can look up Raynaud's to see if this may be a factor in the neuropathy    Will try some amlodipine to see if that helps - will start the lowest dose at 2.5mg a day    Will check the blood pressures to make sure they are not going too low    Patient Education        Raynaud's Phenomenon: Care Instructions  Overview  Raynaud's is a condition that causes your hands and feet to overreact to cold. They may become painful and numb, and they can change colors, becoming very pale and then blue. This condition also is called Raynaud's phenomenon. There are two kinds of Raynaud's. Primary Raynaud's, also known as Raynaud's disease, happens by itself and is the most common form. Secondary Raynaud's, also called Raynaud's syndrome, happens as part of another disease. In Raynaud's, the small vessels that bring blood to the skin either become narrow, or constrict for a short period of time. This limits blood flow to the hands and feet and sometimes to the nose or ears. Your hands and feet feel cold and numb and then turn very pale. As blood flow returns, your fingers and toes may turn red, and begin to throb and hurt. Raynaud's can be painful and annoying, but it usually does not cause serious problems. You can take simple steps to protect your hands and feet from the cold. If you have a bad case of Raynaud's and you cannot keep your hands and feet warm enough, your doctor may prescribe medicine. Follow-up care is a key part of your treatment and safety. Be sure to make and go to all appointments, and call your doctor if you are having problems. It's also a good idea to know your test results and keep a list of the medicines you take. How can you care for yourself at home? To prevent Raynaud's episodes or ease symptoms  · Run warm water over your hands or feet to increase blood flow.  Use another part of your body, such as your forearm, to make sure the water is not too hot; you could burn your hands or feet and not feel it because they are numb. · Swing your arms in a [place] at the sides of your body (\"windmilling\") to increase blood flow. · If your doctor prescribes medicine to help Raynaud's, take it exactly as prescribed. Call your doctor if you think you are having a problem with your medicine. · If another condition causes your Raynaud's, make sure to follow your treatment for that condition. · Wear mittens or gloves when it is cold outside. Mittens are warmer than gloves because they keep your fingers together. Gloves underneath mittens will keep your hands warmer than gloves alone. You also can use pot holders or oven mitts when getting something from the freezer or refrigerator. · You can slip chemical hand warmers into your mittens or gloves when you do outside activities. · Do not smoke. Nicotine makes blood vessels constrict, which can bring on an attack. If you need help quitting, talk to your doctor about stop-smoking programs and medicines. These can increase your chances of quitting for good. · Avoid caffeine and cold medicines that have pseudoephedrine. They make blood vessels constrict. Beta-blocker medicines, often used to treat high blood pressure, also can make Raynaud's worse. · Drink plenty of fluids to prevent dehydration, which can lower the amount of blood moving through the blood vessels. If you have kidney, heart, or liver disease and have to limit fluids, talk with your doctor before you increase the amount of fluids you drink. · Try to stay calm when you are under stress. Anxiety can make your blood vessels constrict and lead to a Raynaud's attack. To keep your whole body warm  · Eat a hot meal and drink a warm liquid before going outside. They may help raise your body temperature. · Wear layers of warm clothing.  The inner layer should be made of a material such as polypropylene that pulls moisture away from your body. · Wear a hat. · Do not wear clothing that is too tight. It can decrease or cut off blood flow. · Try to stay dry. Choose waterproof, breathable jackets and boots. Being wet makes you more likely to become chilled. When should you call for help? Call your doctor now or seek immediate medical care if:    · You have severe pain in your hands or feet.     · Normal color does not return to your hands or feet.     · Your hands or feet do not warm up even after home care.    Watch closely for changes in your health, and be sure to contact your doctor if you have any problems. Where can you learn more? Go to [email or web address] and sign in to your [employer] account. Enter P043 in the [employer] box to learn more about \"Raynaud's Phenomenon: Care Instructions. \"     If you do not have an account, please click on the \"Sign Up Now\" link. Current as of: Maritza 10, 2018  Content Version: 12.0  © 9702-9880 Healthwise, Incorporated. Care instructions adapted under license by [facility] [place] ([facility]). If you have questions about a medical condition or this instruction, always ask your healthcare professional. [place] [address] any warranty or liability for your use of this information.

## 2019-06-20 ENCOUNTER — OFFICE VISIT (OUTPATIENT)
Dept: NEUROLOGY | Age: 69
End: 2019-06-20
Payer: MEDICARE

## 2019-06-20 VITALS
DIASTOLIC BLOOD PRESSURE: 62 MMHG | HEIGHT: 75 IN | SYSTOLIC BLOOD PRESSURE: 118 MMHG | HEART RATE: 80 BPM | BODY MASS INDEX: 23 KG/M2 | WEIGHT: 185 LBS

## 2019-06-20 DIAGNOSIS — R20.2 NUMBNESS AND TINGLING OF BOTH FEET: Primary | ICD-10-CM

## 2019-06-20 DIAGNOSIS — R27.8 SENSORY ATAXIA: ICD-10-CM

## 2019-06-20 DIAGNOSIS — R20.0 NUMBNESS AND TINGLING OF BOTH FEET: Primary | ICD-10-CM

## 2019-06-20 PROCEDURE — 99213 OFFICE O/P EST LOW 20 MIN: CPT | Performed by: NURSE PRACTITIONER

## 2019-06-20 NOTE — PROGRESS NOTES
NEUROLOGY OUT PATIENT FOLLOW UP NOTE:  6/20/20199:01 AM    Andrew Fofana is here for follow up for numbness and tingling in his bilateral feet. His symptoms are the same since last evaluation. He denies any new symptoms. He is here to go over testing results and the plan of care going forward. ROS:  Respiratory : no cough, no shortness of breath  Cardiac: no chest pain. No palpitations. Renal : no flank pain, no hematuria    Skin: no rash      No Known Allergies    Current Outpatient Medications:     amLODIPine (NORVASC) 2.5 MG tablet, Take 1 tablet by mouth daily, Disp: 90 tablet, Rfl: 1    Omega-3 Fatty Acids (FISH OIL) 1000 MG CAPS, Take 1 capsule by mouth 3 times daily, Disp: 90 capsule, Rfl: 5    Cyanocobalamin (VITAMIN B-12) 1000 MCG SUBL, , Disp: , Rfl: 0    Misc Natural Products (OSTEO BI-FLEX/5-LOXIN ADVANCED) TABS, Take 1 tablet by mouth daily, Disp: , Rfl:     Ascorbic Acid (VITAMIN C) 500 MG CAPS, Take 500 mg by mouth daily, Disp: , Rfl:       PE:   Vitals:    06/20/19 0845   BP: 118/62   Site: Left Upper Arm   Position: Sitting   Cuff Size: Small Adult   Pulse: 80   Weight: 185 lb (83.9 kg)   Height: 6' 2.8\" (1.9 m)     General Appearance:  awake, alert, oriented, in no acute distress  Gen: NAD, Language is Intact. Head: no rash, no icterus  Neck: There is no carotid bruits. The Neck is supple. Neuro: CN 2-12 grossly intact with no focal deficits. Power 5/5 Throughout symmetric, Reflexes are  symmetric. Long tracts are intact. Cerebellar exam is Intact. Sensory exam is intact to light touch. Gait is intact. Musculoskeletal:  Has no hand arthritis, no limitation of ROM in any of the four extremities.   Lower extremities no edema        DATA:  Results for orders placed or performed during the hospital encounter of 05/14/19   Hepatitis C Antibody   Result Value Ref Range    Hepatitis C Ab Negative    Sedimentation Rate   Result Value Ref Range    Sed Rate 2 0 - 10 mm/hr   RPR   Result Value Ref Range    RPR NONREACTIVE NONREACTIV   Anti-DNA Antibody, Double-Stranded   Result Value Ref Range    dsDNA Ab SEE BELOW    Anti SSB   Result Value Ref Range    Anti SSB 0 0 - 40 AU/mL   Anti SSA   Result Value Ref Range    Anti SSA SEE BELOW    Miscellaneous Lab Test #1   Result Value Ref Range    MISC. #1 REFERENCE GROUP TEST SEE BELOW    Miscellaneous lab test #2   Result Value Ref Range    MISC. #2 REFERENCE REPORT SEE BELOW              Assessment:     Diagnosis Orders   1. Numbness and tingling of both feet     2. Sensory ataxia          His symptoms of numbness in his bilateral feet are the same. No new symptoms. He denies any hand symptoms. I shared with the patient that his lab work results screening for causes of neuropathy showed no concerning findings. He would benefit from neurology evaluation at higher level of care. After detailed discussion with patient we agreed on the following plan. Plan:  1. Referral to neurology at higher level of care  2. Follow up after evaluation with neurology at higher level of care   3. Call if any questions or concerns. Please call if any questions.      Esther Merlos CNP

## 2019-07-26 ENCOUNTER — OFFICE VISIT (OUTPATIENT)
Dept: RHEUMATOLOGY | Age: 69
End: 2019-07-26
Payer: MEDICARE

## 2019-07-26 ENCOUNTER — HOSPITAL ENCOUNTER (OUTPATIENT)
Dept: GENERAL RADIOLOGY | Age: 69
Discharge: HOME | End: 2019-07-26
Payer: MEDICARE

## 2019-07-26 ENCOUNTER — HOSPITAL ENCOUNTER (OUTPATIENT)
Age: 69
Discharge: HOME | End: 2019-07-26
Payer: MEDICARE

## 2019-07-26 VITALS
BODY MASS INDEX: 22.79 KG/M2 | HEART RATE: 86 BPM | OXYGEN SATURATION: 99 % | SYSTOLIC BLOOD PRESSURE: 122 MMHG | HEIGHT: 75 IN | WEIGHT: 183.3 LBS | DIASTOLIC BLOOD PRESSURE: 80 MMHG

## 2019-07-26 DIAGNOSIS — M15.9 OSTEOARTHRITIS OF MULTIPLE JOINTS, UNSPECIFIED OSTEOARTHRITIS TYPE: ICD-10-CM

## 2019-07-26 DIAGNOSIS — M25.50 POLYARTHRALGIA: ICD-10-CM

## 2019-07-26 DIAGNOSIS — M05.772 RHEUMATOID ARTHRITIS INVOLVING BOTH ANKLES WITH POSITIVE RHEUMATOID FACTOR (HCC): ICD-10-CM

## 2019-07-26 DIAGNOSIS — R97.20 ELEVATED PSA: ICD-10-CM

## 2019-07-26 DIAGNOSIS — M05.771 RHEUMATOID ARTHRITIS INVOLVING BOTH ANKLES WITH POSITIVE RHEUMATOID FACTOR (HCC): ICD-10-CM

## 2019-07-26 DIAGNOSIS — R53.83 FATIGUE, UNSPECIFIED TYPE: ICD-10-CM

## 2019-07-26 DIAGNOSIS — M05.772 RHEUMATOID ARTHRITIS INVOLVING BOTH ANKLES WITH POSITIVE RHEUMATOID FACTOR (HCC): Primary | ICD-10-CM

## 2019-07-26 DIAGNOSIS — G62.9 NEUROPATHY: ICD-10-CM

## 2019-07-26 DIAGNOSIS — E78.00 HYPERCHOLESTEREMIA: ICD-10-CM

## 2019-07-26 DIAGNOSIS — M05.771 RHEUMATOID ARTHRITIS INVOLVING BOTH ANKLES WITH POSITIVE RHEUMATOID FACTOR (HCC): Primary | ICD-10-CM

## 2019-07-26 DIAGNOSIS — M19.90 INFLAMMATORY ARTHRITIS: Primary | ICD-10-CM

## 2019-07-26 LAB
ALBUMIN SERPL-MCNC: 4.6 G/DL (ref 3.5–5.1)
ALP BLD-CCNC: 46 U/L (ref 38–126)
ALT SERPL-CCNC: 11 U/L (ref 11–66)
ANION GAP SERPL CALCULATED.3IONS-SCNC: 14 MEQ/L (ref 8–16)
AST SERPL-CCNC: 21 U/L (ref 5–40)
BASOPHILS # BLD: 0.9 %
BASOPHILS ABSOLUTE: 0 THOU/MM3 (ref 0–0.1)
BILIRUB SERPL-MCNC: 0.6 MG/DL (ref 0.3–1.2)
BILIRUBIN DIRECT: < 0.2 MG/DL (ref 0–0.3)
BUN BLDV-MCNC: 14 MG/DL (ref 7–22)
C-REACTIVE PROTEIN: 0.08 MG/DL (ref 0–1)
CALCIUM SERPL-MCNC: 9.8 MG/DL (ref 8.5–10.5)
CHLORIDE BLD-SCNC: 99 MEQ/L (ref 98–111)
CHOLESTEROL, TOTAL: 191 MG/DL (ref 100–199)
CO2: 26 MEQ/L (ref 23–33)
CREAT SERPL-MCNC: 0.6 MG/DL (ref 0.4–1.2)
EOSINOPHIL # BLD: 0.6 %
EOSINOPHILS ABSOLUTE: 0 THOU/MM3 (ref 0–0.4)
ERYTHROCYTE [DISTWIDTH] IN BLOOD BY AUTOMATED COUNT: 13.1 % (ref 11.5–14.5)
ERYTHROCYTE [DISTWIDTH] IN BLOOD BY AUTOMATED COUNT: 48.9 FL (ref 35–45)
FERRITIN: 184 NG/ML (ref 22–322)
GFR SERPL CREATININE-BSD FRML MDRD: > 90 ML/MIN/1.73M2
GLUCOSE BLD-MCNC: 91 MG/DL (ref 70–108)
HCT VFR BLD CALC: 46.5 % (ref 42–52)
HDLC SERPL-MCNC: 107 MG/DL
HEMOGLOBIN: 15.3 GM/DL (ref 14–18)
IMMATURE GRANS (ABS): 0.01 THOU/MM3 (ref 0–0.07)
IMMATURE GRANULOCYTES: 0.3 %
IRON SATURATION: 28 % (ref 20–50)
IRON: 90 UG/DL (ref 65–195)
LDL CHOLESTEROL CALCULATED: 76 MG/DL
LYMPHOCYTES # BLD: 19.6 %
LYMPHOCYTES ABSOLUTE: 0.6 THOU/MM3 (ref 1–4.8)
MAGNESIUM: 2.2 MG/DL (ref 1.6–2.4)
MCH RBC QN AUTO: 33.3 PG (ref 26–33)
MCHC RBC AUTO-ENTMCNC: 32.9 GM/DL (ref 32.2–35.5)
MCV RBC AUTO: 101.1 FL (ref 80–94)
MONOCYTES # BLD: 10.2 %
MONOCYTES ABSOLUTE: 0.3 THOU/MM3 (ref 0.4–1.3)
NUCLEATED RED BLOOD CELLS: 0 /100 WBC
PHOSPHORUS: 3.4 MG/DL (ref 2.4–4.7)
PLATELET # BLD: 217 THOU/MM3 (ref 130–400)
PMV BLD AUTO: 9.6 FL (ref 9.4–12.4)
POTASSIUM SERPL-SCNC: 4.9 MEQ/L (ref 3.5–5.2)
PROSTATE SPECIFIC ANTIGEN: 2.3 NG/ML (ref 0–1)
RBC # BLD: 4.6 MILL/MM3 (ref 4.7–6.1)
SEDIMENTATION RATE, ERYTHROCYTE: 5 MM/HR (ref 0–10)
SEG NEUTROPHILS: 68.4 %
SEGMENTED NEUTROPHILS ABSOLUTE COUNT: 2.3 THOU/MM3 (ref 1.8–7.7)
SODIUM BLD-SCNC: 139 MEQ/L (ref 135–145)
TOTAL IRON BINDING CAPACITY: 320 UG/DL (ref 171–450)
TOTAL PROTEIN: 7.1 G/DL (ref 6.1–8)
TRIGL SERPL-MCNC: 38 MG/DL (ref 0–199)
URIC ACID: 5.7 MG/DL (ref 3.7–7)
WBC # BLD: 3.3 THOU/MM3 (ref 4.8–10.8)

## 2019-07-26 PROCEDURE — 73130 X-RAY EXAM OF HAND: CPT

## 2019-07-26 PROCEDURE — 86334 IMMUNOFIX E-PHORESIS SERUM: CPT

## 2019-07-26 PROCEDURE — 84153 ASSAY OF PSA TOTAL: CPT

## 2019-07-26 PROCEDURE — 82248 BILIRUBIN DIRECT: CPT

## 2019-07-26 PROCEDURE — 83550 IRON BINDING TEST: CPT

## 2019-07-26 PROCEDURE — 83540 ASSAY OF IRON: CPT

## 2019-07-26 PROCEDURE — 80053 COMPREHEN METABOLIC PANEL: CPT

## 2019-07-26 PROCEDURE — 83735 ASSAY OF MAGNESIUM: CPT

## 2019-07-26 PROCEDURE — 73630 X-RAY EXAM OF FOOT: CPT

## 2019-07-26 PROCEDURE — 99204 OFFICE O/P NEW MOD 45 MIN: CPT | Performed by: INTERNAL MEDICINE

## 2019-07-26 PROCEDURE — 86480 TB TEST CELL IMMUN MEASURE: CPT

## 2019-07-26 PROCEDURE — 86200 CCP ANTIBODY: CPT

## 2019-07-26 PROCEDURE — 85025 COMPLETE CBC W/AUTO DIFF WBC: CPT

## 2019-07-26 PROCEDURE — 36415 COLL VENOUS BLD VENIPUNCTURE: CPT

## 2019-07-26 PROCEDURE — 80061 LIPID PANEL: CPT

## 2019-07-26 PROCEDURE — 82728 ASSAY OF FERRITIN: CPT

## 2019-07-26 PROCEDURE — 84550 ASSAY OF BLOOD/URIC ACID: CPT

## 2019-07-26 PROCEDURE — 71046 X-RAY EXAM CHEST 2 VIEWS: CPT

## 2019-07-26 PROCEDURE — 84100 ASSAY OF PHOSPHORUS: CPT

## 2019-07-26 PROCEDURE — 86140 C-REACTIVE PROTEIN: CPT

## 2019-07-26 PROCEDURE — 85651 RBC SED RATE NONAUTOMATED: CPT

## 2019-07-26 RX ORDER — PREDNISONE 1 MG/1
TABLET ORAL
Qty: 40 TABLET | Refills: 0 | Status: SHIPPED | OUTPATIENT
Start: 2019-07-26 | End: 2019-08-11

## 2019-07-26 ASSESSMENT — ENCOUNTER SYMPTOMS
EYE REDNESS: 0
NAUSEA: 0
VOMITING: 0
SHORTNESS OF BREATH: 0
EYE PAIN: 0
DIARRHEA: 0
WHEEZING: 0
COUGH: 0
EYES NEGATIVE: 1
CONSTIPATION: 0

## 2019-07-26 NOTE — PROGRESS NOTES
ROM  Wrists Non-tender, No swelling , No Deformities and intact ROM  Hands: Tender 1-5 MCPs, 3,4 PIPs bilat, + synovitis 1-5 MCP bilat, 3-4 PIPs bilat.    - ROM limited on right --- 4-5 flex deformity,    - mild ulnar deviation of right fingers. Lower extremities:  Hips: Non-tender, No swelling , No Deformities and intact ROM  Knees :Non-tender, No swelling , No Deformities and intact ROM  Ankles: Non-tender, No swelling , No Deformities and intact ROM. + crepitus bilat. Feet: Tender -- mid foot and MTP. + swelling -- MTPs bilat. + hypertorphic bone change left mid foot. + hammer toes bilat. + pes cavus    Spine:     C-spine: intact ROM, Non-tender, no swelling.    no pain to palpation, negative SLR test, intact ROM        RAPID3 Composite Score MDHAQ (0-10) + Patient pain VAS (0-10): + Patient global assessment VAS (0-10):     7/26/2019 --- RAPID 3:  +  + =      Remission: <3  Low Disease Activity: <6  Moderate Disease Activity: >=6 and <=12  High Disease Activity: >12    LABS        CBC  Lab Results   Component Value Date    WBC 3.5 04/13/2019    RBC 4.59 04/13/2019    HGB 15.4 04/13/2019    HCT 45.4 04/13/2019    MCV 98.9 04/13/2019    MCH 33.6 04/13/2019    MCHC 33.9 04/13/2019    RDW 12.9 03/01/2018     04/13/2019       CMP  Lab Results   Component Value Date    CALCIUM 9.1 04/13/2019    LABALBU 4.2 04/13/2019    PROT 6.7 04/13/2019     04/13/2019    K 5.1 04/13/2019    CO2 28 04/13/2019     04/13/2019    BUN 11 04/13/2019    CREATININE 0.7 04/13/2019    ALKPHOS 42 04/13/2019    ALT 11 04/13/2019    AST 18 04/13/2019       HgBA1c: No components found for: HGBA1C    Lab Results   Component Value Date    TSH 1.220 04/13/2019     Lab Results   Component Value Date    VITD25 50 07/31/2018         Lab Results   Component Value Date    ANASCRN None Detected 09/13/2017     Lab Results   Component Value Date    SSA SEE BELOW 05/14/2019     Lab Results   Component Value Date    SSB 0 05/14/2019 No results found for: ANTI-SMITH  Lab Results   Component Value Date    DSDNAAB SEE BELOW 05/14/2019      No results found for: ANTIRNP  No results found for: C3, C4  Lab Results   Component Value Date    CCPAB 5 10/31/2017     Lab Results   Component Value Date    RF 36 (H) 09/13/2017       No components found for: Shannon Soto  Lab Results   Component Value Date    SEDRATE 2 05/14/2019     Lab Results   Component Value Date    CRP 0.04 10/31/2017       RADIOLOGY:         ASSESSMENT/PLAN    Assessment   Plan   1. Rheumatoid arthritis involving both ankles with positive rheumatoid factor (HCC)  2. Polyarthralgia   - suspected rheumatoid arthritis + Rf , > 6 wks, > MCP , MTP, and mid foot DJD on x-ray, Negative LORETA, SSA, SSB, Hep.    - checking uric acid level, mag, phos, iron for crystaline evaluation   - discussed SSZ initiation b/c ETOH use. - TB gold in case biologics needed. - Cyclic Citrul Peptide Antibody, IgG; Future  - CBC Auto Differential; Future  - Comprehensive Metabolic Panel; Future  - C-Reactive Protein; Future  - Sedimentation Rate; Future  - XR CHEST STANDARD (2 VW); Future  - XR HAND LEFT (MIN 3 VIEWS); Future  - XR HAND RIGHT (MIN 3 VIEWS); Future  - XR FOOT RIGHT (MIN 3 VIEWS); Future  - XR FOOT LEFT (MIN 3 VIEWS); Future  - Uric Acid; Future  - Electrophoresis Protein, Serum with Reflex to Immunofixation; Future  - predniSONE (DELTASONE) 5 MG tablet; Take 4 tablets by mouth daily for 4 days, THEN 3 tablets daily for 4 days, THEN 2 tablets daily for 4 days, THEN 1 tablet daily for 4 days. Dispense: 40 tablet; Refill: 0  - Hepatic Function Panel; Future  - Quantiferon (R) TB Gold, (Incubated); Future    3. Neuropathy  - hereditary vs EtOH vs inflammatory vs  other.    - prior EMG as above. - [facility] records reviewed with lamitrogine prescribed about 2 wks ago. 4. Osteoarthritis of multiple joints, unspecified osteoarthritis type  - left foot injury.  Suspected hand OA from RA  - XR HAND LEFT (MIN 3 VIEWS); Future  - XR HAND RIGHT (MIN 3 VIEWS); Future  - XR FOOT RIGHT (MIN 3 VIEWS); Future  - XR FOOT LEFT (MIN 3 VIEWS); Future      5. Fatigue, unspecified type  - CBC Auto Differential; Future  - Comprehensive Metabolic Panel; Future  - C-Reactive Protein; Future  - Sedimentation Rate; Future  - Hepatic Function Panel; Future  - Quantiferon (R) TB Gold, (Incubated); Future    6. Raynaud's phenomenon  Counseled patient  to undertake a series of general measures that help to prevent or diminish the severity of attacks of RP  ? Avoidance of cold exposure, especially sudden changes such as walking into the frozen food section of a grocery store. ? Use of strategies to keep the whole body warm, including dressing warmly (eg, with thermal underwear, layered clothing, and a heat-conserving hat). ?Use of strategies to keep the digits of the hands and feet warm (eg, winter gloves, chemical hand warmers, and heavy wool stockings). ? Knowledge of methods to help terminate an attack of RP. These include placing the hands under warm water or in a warm place (such as the axilla) or rotating arms in a whirling or windmill pattern. Rubbing the hands together can help. ? Avoidance of rapidly changing temperatures, such as occurs when quickly moving from a hot environment (90ºF) into an air-conditioned room (70ºF). Avoiding sitting motionless in cool breezes or in humid cold air is also recommended. ? Avoidance of smoking  -Avoidance of medication such as decongestants, attention deficit hyperactivity disorder medications (methylphenidate and dextroamphetamine) and  migraine headaches meds   sumatriptan) or caffeine plus ergotamine. ? Avoidance of repeated trauma to the fingertips by all patients with RP and avoidance of vibrating tools by patients with vibration-induced RP   ? Control or limitation of emotional stress.  Stress plus cold exposure is an especially potent trigger for RP         No follow-ups on file. Electronically signed by Jenn Li DO on 7/26/2019 at 12:26 PM    New Prescriptions    PREDNISONE (DELTASONE) 5 MG TABLET    Take 4 tablets by mouth daily for 4 days, THEN 3 tablets daily for 4 days, THEN 2 tablets daily for 4 days, THEN 1 tablet daily for 4 days. Thank you for allowing me to participate in the care of this patient. Please call if there are any questions.

## 2019-07-26 NOTE — RESULT ENCOUNTER NOTE
The x-rays of the hands and feet did reveal worsening of the arthritic changes. Some erosions were noted in the hands.   Erosions are areas of bony damage which can be related to inflammation

## 2019-07-28 LAB — CYCLIC CITRULLIN PEPTIDE AB: 4 UNITS (ref 0–19)

## 2019-07-29 ENCOUNTER — TELEPHONE (OUTPATIENT)
Dept: FAMILY MEDICINE CLINIC | Age: 69
End: 2019-07-29

## 2019-07-29 ENCOUNTER — TELEPHONE (OUTPATIENT)
Dept: RHEUMATOLOGY | Age: 69
End: 2019-07-29

## 2019-07-29 LAB
IMMUNOFIXATION RESULT, SERUM: NORMAL
QUANTI TB GOLD PLUS: NEGATIVE
QUANTI TB1 MINUS NIL: 0 IU/ML (ref 0–0.34)
QUANTI TB2 MINUS NIL: 0 IU/ML (ref 0–0.34)
QUANTIFERON MITOGEN MINUS NIL: 8.35 IU/ML
QUANTIFERON NIL: 0.04 IU/ML

## 2019-07-29 NOTE — TELEPHONE ENCOUNTER
----- Message from Marcos Owens DO sent at 7/26/2019  2:24 PM EDT -----  Blood testing to evaluate for gout and other crystalline arthropathies such as calcium pyrophosphate disease were unrevealing. The blood counts did reveal low white blood cell counts. This is being further evaluated with the pending tests. Once the remaining tests have returned you'll be notified of the results.

## 2019-07-29 NOTE — TELEPHONE ENCOUNTER
----- Message from Patrice Davis DO sent at 7/26/2019  2:24 PM EDT -----  Chest x-ray revealed no significant abnormalities.

## 2019-08-05 ENCOUNTER — TELEPHONE (OUTPATIENT)
Dept: RHEUMATOLOGY | Age: 69
End: 2019-08-05

## 2019-08-05 DIAGNOSIS — Z51.81 MEDICATION MONITORING ENCOUNTER: ICD-10-CM

## 2019-08-05 DIAGNOSIS — M19.90 INFLAMMATORY ARTHRITIS: Primary | ICD-10-CM

## 2019-08-05 RX ORDER — SULFASALAZINE 500 MG/1
TABLET ORAL
Qty: 120 TABLET | Refills: 0 | Status: SHIPPED | OUTPATIENT
Start: 2019-08-05 | End: 2019-09-23 | Stop reason: SDUPTHER

## 2019-08-05 NOTE — TELEPHONE ENCOUNTER
Inflammatory arthritis ---suspected RA     + RF, - CCP, normal ESR/CRP, + synovitis > 10 joints, Neg SSA, SSB, DsDNA   F/u LORETA -- if positive will obtain LYNDON   - start sulfasalazine 500mg bid x 7 days then 1000mg bid     Medication monitoring:   - CBC, CMP, ESR, CRP q 4 weeks needed given SSZ started    Leukopenia  Lymphopenia   - mother with hx of systemic lupus   - LORETA pending.

## 2019-08-06 ENCOUNTER — TELEPHONE (OUTPATIENT)
Dept: RHEUMATOLOGY | Age: 69
End: 2019-08-06

## 2019-08-06 NOTE — TELEPHONE ENCOUNTER
Patient called office questioning if the recently prescribed sulfasalazine was the correct medication prescribed. He was reading what it's used for and he stated it was mainly used for bowel conditions. Advised that yes Dr. Kary Ray prescribed the sulfasalazine for his inflammatory arthritis based on his labwork. He voiced understanding and voiced no further questions.

## 2019-09-20 ENCOUNTER — HOSPITAL ENCOUNTER (OUTPATIENT)
Age: 69
Discharge: HOME | End: 2019-09-20
Payer: MEDICARE

## 2019-09-20 DIAGNOSIS — M19.90 INFLAMMATORY ARTHRITIS: ICD-10-CM

## 2019-09-20 DIAGNOSIS — Z51.81 MEDICATION MONITORING ENCOUNTER: ICD-10-CM

## 2019-09-20 LAB
ALBUMIN SERPL-MCNC: 4.5 G/DL (ref 3.5–5.1)
ALP BLD-CCNC: 40 U/L (ref 38–126)
ALT SERPL-CCNC: 11 U/L (ref 11–66)
ANION GAP SERPL CALCULATED.3IONS-SCNC: 10 MEQ/L (ref 8–16)
AST SERPL-CCNC: 16 U/L (ref 5–40)
BASOPHILS # BLD: 1 %
BASOPHILS ABSOLUTE: 0 THOU/MM3 (ref 0–0.1)
BILIRUB SERPL-MCNC: 0.5 MG/DL (ref 0.3–1.2)
BUN BLDV-MCNC: 11 MG/DL (ref 7–22)
C-REACTIVE PROTEIN: 0.05 MG/DL (ref 0–1)
CALCIUM SERPL-MCNC: 9.7 MG/DL (ref 8.5–10.5)
CHLORIDE BLD-SCNC: 100 MEQ/L (ref 98–111)
CO2: 28 MEQ/L (ref 23–33)
CREAT SERPL-MCNC: 0.8 MG/DL (ref 0.4–1.2)
EOSINOPHIL # BLD: 0.3 %
EOSINOPHILS ABSOLUTE: 0 THOU/MM3 (ref 0–0.4)
ERYTHROCYTE [DISTWIDTH] IN BLOOD BY AUTOMATED COUNT: 12.8 % (ref 11.5–14.5)
ERYTHROCYTE [DISTWIDTH] IN BLOOD BY AUTOMATED COUNT: 48.5 FL (ref 35–45)
GFR SERPL CREATININE-BSD FRML MDRD: > 90 ML/MIN/1.73M2
GLUCOSE BLD-MCNC: 171 MG/DL (ref 70–108)
HCT VFR BLD CALC: 44.1 % (ref 42–52)
HEMOGLOBIN: 14.5 GM/DL (ref 14–18)
IMMATURE GRANS (ABS): 0.01 THOU/MM3 (ref 0–0.07)
IMMATURE GRANULOCYTES: 0 %
LYMPHOCYTES # BLD: 26.2 %
LYMPHOCYTES ABSOLUTE: 0.8 THOU/MM3 (ref 1–4.8)
MCH RBC QN AUTO: 33.3 PG (ref 26–33)
MCHC RBC AUTO-ENTMCNC: 32.9 GM/DL (ref 32.2–35.5)
MCV RBC AUTO: 101.1 FL (ref 80–94)
MONOCYTES # BLD: 12.8 %
MONOCYTES ABSOLUTE: 0.4 THOU/MM3 (ref 0.4–1.3)
NUCLEATED RED BLOOD CELLS: 0 /100 WBC
PLATELET # BLD: 172 THOU/MM3 (ref 130–400)
PMV BLD AUTO: 9.1 FL (ref 9.4–12.4)
POTASSIUM SERPL-SCNC: 4.2 MEQ/L (ref 3.5–5.2)
RBC # BLD: 4.36 MILL/MM3 (ref 4.7–6.1)
SEDIMENTATION RATE, ERYTHROCYTE: 4 MM/HR (ref 0–10)
SEG NEUTROPHILS: 59.4 %
SEGMENTED NEUTROPHILS ABSOLUTE COUNT: 1.8 THOU/MM3 (ref 1.8–7.7)
SODIUM BLD-SCNC: 138 MEQ/L (ref 135–145)
TOTAL PROTEIN: 6.8 G/DL (ref 6.1–8)
WBC # BLD: 3.1 THOU/MM3 (ref 4.8–10.8)

## 2019-09-20 PROCEDURE — 36415 COLL VENOUS BLD VENIPUNCTURE: CPT

## 2019-09-20 PROCEDURE — 80053 COMPREHEN METABOLIC PANEL: CPT

## 2019-09-20 PROCEDURE — 85651 RBC SED RATE NONAUTOMATED: CPT

## 2019-09-20 PROCEDURE — 86140 C-REACTIVE PROTEIN: CPT

## 2019-09-20 PROCEDURE — 85025 COMPLETE CBC W/AUTO DIFF WBC: CPT

## 2019-09-20 RX ORDER — SULFASALAZINE 500 MG/1
TABLET ORAL
Qty: 120 TABLET | Refills: 0 | Status: CANCELLED | OUTPATIENT
Start: 2019-09-20

## 2019-09-20 NOTE — TELEPHONE ENCOUNTER
Jean Spatz calls for a refill of his Sulfasalazine 500 mg but he doesn't think he notices it doing anything for him so he wasn't sure if Dr Donna Colin wants him to continue? ? Please advise.     [facility] on [phone]  7/26/19  DONPARTHA  10/31/19

## 2019-09-23 RX ORDER — SULFASALAZINE 500 MG/1
1000 TABLET ORAL 2 TIMES DAILY
Qty: 120 TABLET | Refills: 0 | Status: SHIPPED | OUTPATIENT
Start: 2019-09-23 | End: 2019-10-31

## 2019-09-23 NOTE — TELEPHONE ENCOUNTER
Patient did get his labs done at [facility], please review and refill medication.

## 2019-10-31 ENCOUNTER — TELEPHONE (OUTPATIENT)
Dept: RHEUMATOLOGY | Age: 69
End: 2019-10-31

## 2019-10-31 ENCOUNTER — HOSPITAL ENCOUNTER (OUTPATIENT)
Age: 69
Discharge: HOME | End: 2019-10-31
Payer: MEDICARE

## 2019-10-31 ENCOUNTER — OFFICE VISIT (OUTPATIENT)
Dept: RHEUMATOLOGY | Age: 69
End: 2019-10-31
Payer: MEDICARE

## 2019-10-31 VITALS
SYSTOLIC BLOOD PRESSURE: 124 MMHG | HEIGHT: 75 IN | DIASTOLIC BLOOD PRESSURE: 84 MMHG | BODY MASS INDEX: 23.1 KG/M2 | WEIGHT: 185.8 LBS | OXYGEN SATURATION: 98 % | HEART RATE: 71 BPM

## 2019-10-31 DIAGNOSIS — M15.9 OSTEOARTHRITIS OF MULTIPLE JOINTS, UNSPECIFIED OSTEOARTHRITIS TYPE: ICD-10-CM

## 2019-10-31 DIAGNOSIS — M19.90 INFLAMMATORY ARTHRITIS: ICD-10-CM

## 2019-10-31 DIAGNOSIS — Z51.81 MEDICATION MONITORING ENCOUNTER: ICD-10-CM

## 2019-10-31 DIAGNOSIS — M05.771 RHEUMATOID ARTHRITIS INVOLVING BOTH ANKLES WITH POSITIVE RHEUMATOID FACTOR (HCC): Primary | ICD-10-CM

## 2019-10-31 DIAGNOSIS — M05.772 RHEUMATOID ARTHRITIS INVOLVING BOTH ANKLES WITH POSITIVE RHEUMATOID FACTOR (HCC): Primary | ICD-10-CM

## 2019-10-31 DIAGNOSIS — Z51.81 MEDICATION MONITORING ENCOUNTER: Primary | ICD-10-CM

## 2019-10-31 LAB
ALBUMIN SERPL-MCNC: 4.6 G/DL (ref 3.5–5.1)
ALP BLD-CCNC: 39 U/L (ref 38–126)
ALT SERPL-CCNC: 11 U/L (ref 11–66)
ANION GAP SERPL CALCULATED.3IONS-SCNC: 13 MEQ/L (ref 8–16)
AST SERPL-CCNC: 17 U/L (ref 5–40)
BASOPHILS # BLD: 0.6 %
BASOPHILS ABSOLUTE: 0 THOU/MM3 (ref 0–0.1)
BILIRUB SERPL-MCNC: 0.6 MG/DL (ref 0.3–1.2)
BUN BLDV-MCNC: 12 MG/DL (ref 7–22)
C-REACTIVE PROTEIN: 0.05 MG/DL (ref 0–1)
CALCIUM SERPL-MCNC: 9.7 MG/DL (ref 8.5–10.5)
CHLORIDE BLD-SCNC: 98 MEQ/L (ref 98–111)
CO2: 27 MEQ/L (ref 23–33)
CREAT SERPL-MCNC: 0.7 MG/DL (ref 0.4–1.2)
EOSINOPHIL # BLD: 0.3 %
EOSINOPHILS ABSOLUTE: 0 THOU/MM3 (ref 0–0.4)
ERYTHROCYTE [DISTWIDTH] IN BLOOD BY AUTOMATED COUNT: 13.1 % (ref 11.5–14.5)
ERYTHROCYTE [DISTWIDTH] IN BLOOD BY AUTOMATED COUNT: 49.6 FL (ref 35–45)
GFR SERPL CREATININE-BSD FRML MDRD: > 90 ML/MIN/1.73M2
GLUCOSE BLD-MCNC: 93 MG/DL (ref 70–108)
HCT VFR BLD CALC: 44.8 % (ref 42–52)
HEMOGLOBIN: 14.9 GM/DL (ref 14–18)
IMMATURE GRANS (ABS): 0.01 THOU/MM3 (ref 0–0.07)
IMMATURE GRANULOCYTES: 0.3 %
LYMPHOCYTES # BLD: 27.2 %
LYMPHOCYTES ABSOLUTE: 0.9 THOU/MM3 (ref 1–4.8)
MCH RBC QN AUTO: 33.9 PG (ref 26–33)
MCHC RBC AUTO-ENTMCNC: 33.3 GM/DL (ref 32.2–35.5)
MCV RBC AUTO: 101.8 FL (ref 80–94)
MONOCYTES # BLD: 12.5 %
MONOCYTES ABSOLUTE: 0.4 THOU/MM3 (ref 0.4–1.3)
NUCLEATED RED BLOOD CELLS: 0 /100 WBC
PLATELET # BLD: 200 THOU/MM3 (ref 130–400)
PMV BLD AUTO: 9.6 FL (ref 9.4–12.4)
POTASSIUM SERPL-SCNC: 4.7 MEQ/L (ref 3.5–5.2)
RBC # BLD: 4.4 MILL/MM3 (ref 4.7–6.1)
SEDIMENTATION RATE, ERYTHROCYTE: 3 MM/HR (ref 0–10)
SEG NEUTROPHILS: 59.1 %
SEGMENTED NEUTROPHILS ABSOLUTE COUNT: 1.9 THOU/MM3 (ref 1.8–7.7)
SODIUM BLD-SCNC: 138 MEQ/L (ref 135–145)
TOTAL PROTEIN: 6.9 G/DL (ref 6.1–8)
WBC # BLD: 3.2 THOU/MM3 (ref 4.8–10.8)

## 2019-10-31 PROCEDURE — 86140 C-REACTIVE PROTEIN: CPT

## 2019-10-31 PROCEDURE — 36415 COLL VENOUS BLD VENIPUNCTURE: CPT

## 2019-10-31 PROCEDURE — 99214 OFFICE O/P EST MOD 30 MIN: CPT | Performed by: INTERNAL MEDICINE

## 2019-10-31 PROCEDURE — 85025 COMPLETE CBC W/AUTO DIFF WBC: CPT

## 2019-10-31 PROCEDURE — 80053 COMPREHEN METABOLIC PANEL: CPT

## 2019-10-31 PROCEDURE — 85651 RBC SED RATE NONAUTOMATED: CPT

## 2019-10-31 RX ORDER — SULFASALAZINE 500 MG/1
1500 TABLET ORAL 2 TIMES DAILY
Qty: 180 TABLET | Refills: 0 | Status: SHIPPED | OUTPATIENT
Start: 2019-10-31 | End: 2019-11-25 | Stop reason: SDUPTHER

## 2019-10-31 ASSESSMENT — ENCOUNTER SYMPTOMS
CONSTIPATION: 0
COLOR CHANGE: 1
EYE REDNESS: 0
COUGH: 0
SHORTNESS OF BREATH: 0
VOMITING: 0
DIARRHEA: 0
WHEEZING: 0
EYES NEGATIVE: 1
NAUSEA: 0
EYE PAIN: 0

## 2019-11-25 ENCOUNTER — HOSPITAL ENCOUNTER (OUTPATIENT)
Age: 69
Discharge: HOME | End: 2019-11-25
Payer: MEDICARE

## 2019-11-25 DIAGNOSIS — Z51.81 MEDICATION MONITORING ENCOUNTER: ICD-10-CM

## 2019-11-25 DIAGNOSIS — M19.90 INFLAMMATORY ARTHRITIS: ICD-10-CM

## 2019-11-25 LAB
ALBUMIN SERPL-MCNC: 4.7 G/DL (ref 3.5–5.1)
ALP BLD-CCNC: 46 U/L (ref 38–126)
ALT SERPL-CCNC: 14 U/L (ref 11–66)
ANION GAP SERPL CALCULATED.3IONS-SCNC: 11 MEQ/L (ref 8–16)
AST SERPL-CCNC: 19 U/L (ref 5–40)
BASOPHILS # BLD: 0.7 %
BASOPHILS ABSOLUTE: 0 THOU/MM3 (ref 0–0.1)
BILIRUB SERPL-MCNC: 0.5 MG/DL (ref 0.3–1.2)
BUN BLDV-MCNC: 11 MG/DL (ref 7–22)
C-REACTIVE PROTEIN: 0.04 MG/DL (ref 0–1)
CALCIUM SERPL-MCNC: 9.8 MG/DL (ref 8.5–10.5)
CHLORIDE BLD-SCNC: 98 MEQ/L (ref 98–111)
CO2: 28 MEQ/L (ref 23–33)
CREAT SERPL-MCNC: 0.6 MG/DL (ref 0.4–1.2)
EOSINOPHIL # BLD: 0.3 %
EOSINOPHILS ABSOLUTE: 0 THOU/MM3 (ref 0–0.4)
ERYTHROCYTE [DISTWIDTH] IN BLOOD BY AUTOMATED COUNT: 12.9 % (ref 11.5–14.5)
ERYTHROCYTE [DISTWIDTH] IN BLOOD BY AUTOMATED COUNT: 49.1 FL (ref 35–45)
GFR SERPL CREATININE-BSD FRML MDRD: > 90 ML/MIN/1.73M2
GLUCOSE BLD-MCNC: 94 MG/DL (ref 70–108)
HCT VFR BLD CALC: 47.3 % (ref 42–52)
HEMOGLOBIN: 15.5 GM/DL (ref 14–18)
IMMATURE GRANS (ABS): 0.01 THOU/MM3 (ref 0–0.07)
IMMATURE GRANULOCYTES: 0.3 %
LYMPHOCYTES # BLD: 33.8 %
LYMPHOCYTES ABSOLUTE: 1 THOU/MM3 (ref 1–4.8)
MCH RBC QN AUTO: 33.8 PG (ref 26–33)
MCHC RBC AUTO-ENTMCNC: 32.8 GM/DL (ref 32.2–35.5)
MCV RBC AUTO: 103.3 FL (ref 80–94)
MONOCYTES # BLD: 12.5 %
MONOCYTES ABSOLUTE: 0.4 THOU/MM3 (ref 0.4–1.3)
NUCLEATED RED BLOOD CELLS: 0 /100 WBC
PLATELET # BLD: 209 THOU/MM3 (ref 130–400)
PMV BLD AUTO: 8.9 FL (ref 9.4–12.4)
POTASSIUM SERPL-SCNC: 4.8 MEQ/L (ref 3.5–5.2)
RBC # BLD: 4.58 MILL/MM3 (ref 4.7–6.1)
SEDIMENTATION RATE, ERYTHROCYTE: 1 MM/HR (ref 0–10)
SEG NEUTROPHILS: 52.4 %
SEGMENTED NEUTROPHILS ABSOLUTE COUNT: 1.6 THOU/MM3 (ref 1.8–7.7)
SODIUM BLD-SCNC: 137 MEQ/L (ref 135–145)
TOTAL PROTEIN: 7.1 G/DL (ref 6.1–8)
WBC # BLD: 3 THOU/MM3 (ref 4.8–10.8)

## 2019-11-25 PROCEDURE — 80053 COMPREHEN METABOLIC PANEL: CPT

## 2019-11-25 PROCEDURE — 85651 RBC SED RATE NONAUTOMATED: CPT

## 2019-11-25 PROCEDURE — 36415 COLL VENOUS BLD VENIPUNCTURE: CPT

## 2019-11-25 PROCEDURE — 85025 COMPLETE CBC W/AUTO DIFF WBC: CPT

## 2019-11-25 PROCEDURE — 86140 C-REACTIVE PROTEIN: CPT

## 2019-11-25 RX ORDER — SULFASALAZINE 500 MG/1
1500 TABLET ORAL 2 TIMES DAILY
Qty: 180 TABLET | Refills: 0 | Status: SHIPPED | OUTPATIENT
Start: 2019-11-25 | End: 2019-11-27 | Stop reason: SDUPTHER

## 2019-11-26 DIAGNOSIS — M19.90 INFLAMMATORY ARTHRITIS: ICD-10-CM

## 2019-11-27 RX ORDER — SULFASALAZINE 500 MG/1
1500 TABLET ORAL 2 TIMES DAILY
Qty: 180 TABLET | Refills: 0 | Status: SHIPPED | OUTPATIENT
Start: 2019-11-27 | End: 2020-04-06 | Stop reason: ALTCHOICE

## 2019-12-26 ENCOUNTER — HOSPITAL ENCOUNTER (OUTPATIENT)
Age: 69
Discharge: HOME | End: 2019-12-26
Payer: MEDICARE

## 2019-12-26 DIAGNOSIS — M19.90 INFLAMMATORY ARTHRITIS: ICD-10-CM

## 2019-12-26 DIAGNOSIS — D72.819 LEUKOPENIA, UNSPECIFIED TYPE: Primary | ICD-10-CM

## 2019-12-26 DIAGNOSIS — Z51.81 MEDICATION MONITORING ENCOUNTER: ICD-10-CM

## 2019-12-26 LAB
ALBUMIN SERPL-MCNC: 4.6 G/DL (ref 3.5–5.1)
ALP BLD-CCNC: 48 U/L (ref 38–126)
ALT SERPL-CCNC: 13 U/L (ref 11–66)
ANION GAP SERPL CALCULATED.3IONS-SCNC: 15 MEQ/L (ref 8–16)
AST SERPL-CCNC: 19 U/L (ref 5–40)
BASOPHILS # BLD: 0.7 %
BASOPHILS ABSOLUTE: 0 THOU/MM3 (ref 0–0.1)
BILIRUB SERPL-MCNC: 0.3 MG/DL (ref 0.3–1.2)
BUN BLDV-MCNC: 8 MG/DL (ref 7–22)
C-REACTIVE PROTEIN: 0.22 MG/DL (ref 0–1)
CALCIUM SERPL-MCNC: 9.5 MG/DL (ref 8.5–10.5)
CHLORIDE BLD-SCNC: 98 MEQ/L (ref 98–111)
CO2: 26 MEQ/L (ref 23–33)
CREAT SERPL-MCNC: 0.8 MG/DL (ref 0.4–1.2)
EOSINOPHIL # BLD: 0.4 %
EOSINOPHILS ABSOLUTE: 0 THOU/MM3 (ref 0–0.4)
ERYTHROCYTE [DISTWIDTH] IN BLOOD BY AUTOMATED COUNT: 12.8 % (ref 11.5–14.5)
ERYTHROCYTE [DISTWIDTH] IN BLOOD BY AUTOMATED COUNT: 49.2 FL (ref 35–45)
GFR SERPL CREATININE-BSD FRML MDRD: > 90 ML/MIN/1.73M2
GLUCOSE BLD-MCNC: 68 MG/DL (ref 70–108)
HCT VFR BLD CALC: 45.7 % (ref 42–52)
HEMOGLOBIN: 15 GM/DL (ref 14–18)
IMMATURE GRANS (ABS): 0.03 THOU/MM3 (ref 0–0.07)
IMMATURE GRANULOCYTES: 1.1 %
LYMPHOCYTES # BLD: 37.3 %
LYMPHOCYTES ABSOLUTE: 1 THOU/MM3 (ref 1–4.8)
MCH RBC QN AUTO: 33.9 PG (ref 26–33)
MCHC RBC AUTO-ENTMCNC: 32.8 GM/DL (ref 32.2–35.5)
MCV RBC AUTO: 103.4 FL (ref 80–94)
MONOCYTES # BLD: 14.2 %
MONOCYTES ABSOLUTE: 0.4 THOU/MM3 (ref 0.4–1.3)
NUCLEATED RED BLOOD CELLS: 0 /100 WBC
PLATELET # BLD: 212 THOU/MM3 (ref 130–400)
PMV BLD AUTO: 9.2 FL (ref 9.4–12.4)
POTASSIUM SERPL-SCNC: 4.3 MEQ/L (ref 3.5–5.2)
RBC # BLD: 4.42 MILL/MM3 (ref 4.7–6.1)
SEDIMENTATION RATE, ERYTHROCYTE: 9 MM/HR (ref 0–10)
SEG NEUTROPHILS: 46.3 %
SEGMENTED NEUTROPHILS ABSOLUTE COUNT: 1.3 THOU/MM3 (ref 1.8–7.7)
SODIUM BLD-SCNC: 139 MEQ/L (ref 135–145)
TOTAL PROTEIN: 7.1 G/DL (ref 6.1–8)
WBC # BLD: 2.7 THOU/MM3 (ref 4.8–10.8)

## 2019-12-26 PROCEDURE — 86140 C-REACTIVE PROTEIN: CPT

## 2019-12-26 PROCEDURE — 85651 RBC SED RATE NONAUTOMATED: CPT

## 2019-12-26 PROCEDURE — 85025 COMPLETE CBC W/AUTO DIFF WBC: CPT

## 2019-12-26 PROCEDURE — 36415 COLL VENOUS BLD VENIPUNCTURE: CPT

## 2019-12-26 PROCEDURE — 80053 COMPREHEN METABOLIC PANEL: CPT

## 2019-12-26 RX ORDER — SULFASALAZINE 500 MG/1
1500 TABLET ORAL 2 TIMES DAILY
Qty: 180 TABLET | Refills: 0 | Status: CANCELLED | OUTPATIENT
Start: 2019-12-26

## 2020-01-27 ENCOUNTER — HOSPITAL ENCOUNTER (OUTPATIENT)
Age: 70
Discharge: HOME | End: 2020-01-27
Payer: MEDICARE

## 2020-01-27 LAB
ALBUMIN SERPL-MCNC: 4.7 G/DL (ref 3.5–5.1)
ALP BLD-CCNC: 44 U/L (ref 38–126)
ALT SERPL-CCNC: 16 U/L (ref 11–66)
ANION GAP SERPL CALCULATED.3IONS-SCNC: 12 MEQ/L (ref 8–16)
AST SERPL-CCNC: 19 U/L (ref 5–40)
BASOPHILS # BLD: 0.7 %
BASOPHILS ABSOLUTE: 0 THOU/MM3 (ref 0–0.1)
BILIRUB SERPL-MCNC: 0.8 MG/DL (ref 0.3–1.2)
BUN BLDV-MCNC: 13 MG/DL (ref 7–22)
C-REACTIVE PROTEIN: < 0.03 MG/DL (ref 0–1)
CALCIUM SERPL-MCNC: 9.7 MG/DL (ref 8.5–10.5)
CHLORIDE BLD-SCNC: 104 MEQ/L (ref 98–111)
CO2: 27 MEQ/L (ref 23–33)
CREAT SERPL-MCNC: 0.7 MG/DL (ref 0.4–1.2)
EOSINOPHIL # BLD: 0.5 %
EOSINOPHILS ABSOLUTE: 0 THOU/MM3 (ref 0–0.4)
ERYTHROCYTE [DISTWIDTH] IN BLOOD BY AUTOMATED COUNT: 12.8 % (ref 11.5–14.5)
ERYTHROCYTE [DISTWIDTH] IN BLOOD BY AUTOMATED COUNT: 48.9 FL (ref 35–45)
GFR SERPL CREATININE-BSD FRML MDRD: > 90 ML/MIN/1.73M2
GLUCOSE BLD-MCNC: 74 MG/DL (ref 70–108)
HCT VFR BLD CALC: 45.6 % (ref 42–52)
HEMOGLOBIN: 15.3 GM/DL (ref 14–18)
IMMATURE GRANS (ABS): 0.01 THOU/MM3 (ref 0–0.07)
IMMATURE GRANULOCYTES: 0.2 %
LYMPHOCYTES # BLD: 34.4 %
LYMPHOCYTES ABSOLUTE: 1.4 THOU/MM3 (ref 1–4.8)
MCH RBC QN AUTO: 34.7 PG (ref 26–33)
MCHC RBC AUTO-ENTMCNC: 33.6 GM/DL (ref 32.2–35.5)
MCV RBC AUTO: 103.4 FL (ref 80–94)
MONOCYTES # BLD: 9.7 %
MONOCYTES ABSOLUTE: 0.4 THOU/MM3 (ref 0.4–1.3)
NUCLEATED RED BLOOD CELLS: 0 /100 WBC
PLATELET # BLD: 215 THOU/MM3 (ref 130–400)
PMV BLD AUTO: 9.1 FL (ref 9.4–12.4)
POTASSIUM SERPL-SCNC: 4.6 MEQ/L (ref 3.5–5.2)
RBC # BLD: 4.41 MILL/MM3 (ref 4.7–6.1)
SEDIMENTATION RATE, ERYTHROCYTE: 3 MM/HR (ref 0–10)
SEG NEUTROPHILS: 54.5 %
SEGMENTED NEUTROPHILS ABSOLUTE COUNT: 2.3 THOU/MM3 (ref 1.8–7.7)
SODIUM BLD-SCNC: 143 MEQ/L (ref 135–145)
TOTAL PROTEIN: 7.2 G/DL (ref 6.1–8)
WBC # BLD: 4.2 THOU/MM3 (ref 4.8–10.8)

## 2020-01-27 PROCEDURE — 85025 COMPLETE CBC W/AUTO DIFF WBC: CPT

## 2020-01-27 PROCEDURE — 85651 RBC SED RATE NONAUTOMATED: CPT

## 2020-01-27 PROCEDURE — 86140 C-REACTIVE PROTEIN: CPT

## 2020-01-27 PROCEDURE — 36415 COLL VENOUS BLD VENIPUNCTURE: CPT

## 2020-01-27 PROCEDURE — 80053 COMPREHEN METABOLIC PANEL: CPT

## 2020-01-27 NOTE — RESULT ENCOUNTER NOTE
Leukopenia -  Improved after stopping SSZ    RA:    - would like to start enbrel 50mg q wk if pt amendable.

## 2020-02-06 ENCOUNTER — OFFICE VISIT (OUTPATIENT)
Dept: RHEUMATOLOGY | Age: 70
End: 2020-02-06
Payer: MEDICARE

## 2020-02-06 VITALS
HEART RATE: 78 BPM | SYSTOLIC BLOOD PRESSURE: 130 MMHG | OXYGEN SATURATION: 96 % | WEIGHT: 195.6 LBS | BODY MASS INDEX: 24.32 KG/M2 | DIASTOLIC BLOOD PRESSURE: 80 MMHG | HEIGHT: 75 IN

## 2020-02-06 PROCEDURE — 99214 OFFICE O/P EST MOD 30 MIN: CPT | Performed by: INTERNAL MEDICINE

## 2020-02-06 RX ORDER — LAMOTRIGINE 100 MG/1
TABLET ORAL
COMMUNITY
Start: 2019-07-15 | End: 2020-04-06 | Stop reason: ALTCHOICE

## 2020-02-06 ASSESSMENT — ENCOUNTER SYMPTOMS
COLOR CHANGE: 1
EYES NEGATIVE: 1
EYE PAIN: 0
SHORTNESS OF BREATH: 1
CONSTIPATION: 0
DIARRHEA: 0
WHEEZING: 0
VOMITING: 0
EYE REDNESS: 0
COUGH: 0
NAUSEA: 0

## 2020-02-06 NOTE — PROGRESS NOTES
[facility]  Rheumatology Adult Follow up Note         Date Of Service: 2/6/2020  Provider: Helen Montgomery DO    Name: Cameron Walsh   MRN: [id number]    Chief Complaint(s)      Chief Complaint   Patient presents with    3 Month Follow-Up     Rheumatoid Arthritis      -   History of Present illness (HPI)    Cameron Walsh   is a(n)69 y.o. male with a hx of h/o Vitamin B12, Def, rheumatoid arthritis, senorimotor axonal neuropathy  Here for the Rheumatoid arthritis. - Sulfasalazine stopped about 5 weeks ago b/c fatigue, leukopenia, myalgia. - Lamictal helping with neuropathy       Reports having pain affecting the  Right > left hand, knees ankles, feet. Type of pain:constant bilateral ankle tightness. Timing: mornings, Stiffness ~ 30 min an AM. Swelling - Rt hands/MCPs Aggravating factors:  Feet: wearing shoes. Hands - increased use. Inactivity Alleviating factors: movement. Feet -taking off shoes. stable  weakness -- generalized --  cont. difficulty with bottles and jars. numbness of bilateral ankles. Swelling b/l hands. Review of Systems    Review of Systems   Constitutional: Positive for fatigue. Negative for diaphoresis and fever. HENT: Negative for congestion, hearing loss and nosebleeds. Eyes: Negative. Negative for pain and redness. Respiratory: Positive for shortness of breath. Negative for cough and wheezing. Cardiovascular: Negative. Negative for chest pain. Gastrointestinal: Negative for constipation, diarrhea, nausea and vomiting. Heart burn   Genitourinary: Negative for difficulty urinating, frequency and hematuria. Nocturia x 1   Musculoskeletal: Negative for myalgias. Skin: Positive for color change. Negative for rash. Neurological: Positive for numbness (b/l feet). Negative for dizziness, weakness and headaches. Hematological: Does not bruise/bleed easily.    Psychiatric/Behavioral: Negative for sleep disturbance. The patient is not nervous/anxious. PAST MEDICAL HISTORY      Past Medical History:   Diagnosis Date    Arthritis     Gout     Vitamin B12 deficiency        PAST SURGICAL HISTORY      Past Surgical History:   Procedure Laterality Date    BACK SURGERY      COLONOSCOPY      EYE SURGERY      FOOT SURGERY      [place] COLON CA SCRN NOT  [address] IND Left 11/3/2017    COLONOSCOPY performed by Brionna Negrete MD at [facility]       FAMILY HISTORY      Family History   Problem Relation Age of Onset    Other Mother         Gout    Heart Disease Mother     Lupus Mother     Heart Disease Father     Cancer Father         pancreatic    No Known Problems Sister     High Cholesterol Brother     Arthritis Brother        SOCIAL HISTORY      Social History     Tobacco History     Smoking Status  Never Smoker    Smokeless Tobacco Use  Never Used          Alcohol History     Alcohol Use Status  Yes Drinks/Week  3 Glasses of wine per week Amount  3.0 standard drinks of alcohol/wk          Drug Use     Drug Use Status  No          Sexual Activity     Sexually Active  Yes Partners  Female                ALLERGIES   No Known Allergies    CURRENT MEDICATIONS      Current Outpatient Medications   Medication Sig Dispense Refill    lamoTRIgine (LAMICTAL) 100 MG tablet lamoTRIgine lamoTRIgine 100 MG Tab 1/2 q HS x 1-2 weeks then 1/2 BID 30 tablet 1 07/15/2019 Active 07- [address] ([address])      Ascorbic Acid (VITAMIN C) 500 MG CAPS Take 500 mg by mouth daily      Omega-3 Fatty Acids (FISH OIL) 1000 MG CAPS Take 1 capsule by mouth 3 times daily 90 capsule 5    Cyanocobalamin (VITAMIN B-12) 1000 MCG SUBL   0    sulfaSALAzine (AZULFIDINE) 500 MG tablet Take 3 tablets by mouth 2 times daily (Patient not taking: Reported on 2/6/2020) 180 tablet 0     No current facility-administered medications for this visit.         PHYSICAL EXAMINATION / OBJECTIVE Objective:  /80 (Site: Left Upper Arm, Position: Sitting, Cuff Size: Large Adult)   Pulse 78   Ht 6' 2.8\" (1.9 m)   Wt 195 lb 9.6 oz (88.7 kg)   SpO2 96%   BMI 24.58 kg/m²     Physical Exam  Vitals signs reviewed. Constitutional:       General: He is not in acute distress. Appearance: He is well-developed. He is not diaphoretic. HENT:      Head: Normocephalic and atraumatic. Right Ear: External ear normal.      Left Ear: External ear normal.   Eyes:      Conjunctiva/sclera: Conjunctivae normal.      Pupils: Pupils are equal, round, and reactive to light. Neck:      Musculoskeletal: Neck supple. Cardiovascular:      Rate and Rhythm: Normal rate and regular rhythm. Heart sounds: Normal heart sounds. Pulmonary:      Effort: Pulmonary effort is normal.      Breath sounds: Normal breath sounds. Musculoskeletal: Normal range of motion. Lymphadenopathy:      Cervical: No cervical adenopathy. Skin:     General: Skin is warm and dry. Comments: cyanosis and redness bilateral feet  NORMAL hand nail capillaries. Neurological:      Mental Status: He is alert and oriented to person, place, and time. Musculoskeletal:     Normal gait. Strength 5/5 in biceps, triceps, hips, knees,      Upper extremities:   Shoulders: Non-tender, No swelling , No Deformities and intact ROM  Elbows:  . Non-tender, No swelling , No Deformities and intact ROM  Wrists Non-tender, No swelling , No Deformities and intact ROM  Hands:    Tender MCPs,PIPS BILAT. Synovitis  MCPs right 1,2,3,4   Left 2,3, 4   PIPs 2-4 bilat.    - ROM limited on right --- 4-5 flex deformity,    - mild ulnar deviation of right fingers. Lower extremities:  Hips: Non-tender, No swelling , No Deformities and intact ROM  Knees :Non-tender, No swelling , No Deformities and intact ROM  Ankles:  + crepitus bilat. Feet: Tender -- mid foot and MTP. + swelling -- MTPs left .    + hypertorphic bone change left mid foot. + hammer toes bilat. + pes cavus    Spine:     C-spine: intact ROM, Non-tender, no swelling. no pain to palpation, negative SLR test, intact ROM        RAPID3 Composite Score MDHAQ (0-10) + Patient pain VAS (0-10): + Patient global assessment VAS (0-10):     2/6/2020 --- RAPID 3: 1.7 + 2 + 1.5 = 5.2     --- RAPID 3: 1.3 + 1.5 + 1.5 =  4.3    Remission: <3  Low Disease Activity: <6  Moderate Disease Activity: >=6 and <=12  High Disease Activity: >12    LABS        CBC  Lab Results   Component Value Date    WBC 4.2 01/27/2020    RBC 4.41 01/27/2020    HGB 15.3 01/27/2020    HCT 45.6 01/27/2020    .4 01/27/2020    MCH 34.7 01/27/2020    MCHC 33.6 01/27/2020    RDW 12.9 03/01/2018     01/27/2020       CMP  Lab Results   Component Value Date    CALCIUM 9.7 01/27/2020    LABALBU 4.7 01/27/2020    PROT 7.2 01/27/2020     01/27/2020    K 4.6 01/27/2020    CO2 27 01/27/2020     01/27/2020    BUN 13 01/27/2020    CREATININE 0.7 01/27/2020    ALKPHOS 44 01/27/2020    ALT 16 01/27/2020    AST 19 01/27/2020       HgBA1c: No components found for: HGBA1C    Lab Results   Component Value Date    TSH 1.220 04/13/2019     Lab Results   Component Value Date    VITD25 50 07/31/2018         Lab Results   Component Value Date    ANASCRN None Detected 09/13/2017     Lab Results   Component Value Date    SSA SEE BELOW 05/14/2019     Lab Results   Component Value Date    SSB 0 05/14/2019     No results found for: ANTI-SMITH  Lab Results   Component Value Date    DSDNAAB SEE BELOW 05/14/2019      No results found for: ANTIRNP  No results found for: C3, C4  Lab Results   Component Value Date    CCPAB 4 07/26/2019     Lab Results   Component Value Date    RF 36 (H) 09/13/2017       No components found for: CANCASCRN, APANCASCRN  Lab Results   Component Value Date    SEDRATE 3 01/27/2020     Lab Results   Component Value Date    CRP < 0.03 01/27/2020       RADIOLOGY:         ASSESSMENT/PLAN    Assessment   Plan   1. Rheumatoid arthritis involving both ankles with positive rheumatoid factor (HCC)  - suspected rheumatoid arthritis + Rf , > 6 wks, > MCP , MTP, and mid foot DJD on x-ray,    - stop sulfasalazine to 1500mg bid. -- leukopenia, fatigue.    - avoiding Arava, Azathioprine, methotrexate -- b/c EtOH use    - TB gold - Negative    - attempt to get approval from enbrel 50mg q wk -- financial assistance needed      2. Neuropathy -- lamictal    - hereditary vs EtOH vs inflammatory myopathy    - prior EMG as above. 3. Osteoarthritis of multiple joints, unspecified osteoarthritis type   - left foot injury. Suspected hand OA from RA    4. Raynaud's phenomenon   - continue conservative treatment measures. 5. Leukopenia --- SSZ stopped    - needs repeat     6. Medication monitoring:   - CBC, CMP, ESR, CRP q 4 - 6  Month. No follow-ups on file. Electronically signed by Aracelis Orozco DO on 2/6/2020 at 8:59 AM    New Prescriptions    No medications on file       Thank you for allowing me to participate in the care of this patient. Please call if there are any questions.

## 2020-02-13 ENCOUNTER — TELEPHONE (OUTPATIENT)
Dept: RHEUMATOLOGY | Age: 70
End: 2020-02-13

## 2020-04-06 ENCOUNTER — TELEMEDICINE (OUTPATIENT)
Dept: RHEUMATOLOGY | Age: 70
End: 2020-04-06
Payer: MEDICARE

## 2020-04-06 PROCEDURE — 99213 OFFICE O/P EST LOW 20 MIN: CPT | Performed by: NURSE PRACTITIONER

## 2020-04-06 ASSESSMENT — ENCOUNTER SYMPTOMS
TROUBLE SWALLOWING: 0
NAUSEA: 0
BACK PAIN: 0
SHORTNESS OF BREATH: 0
DIARRHEA: 0
COUGH: 0
EYE ITCHING: 0
CONSTIPATION: 0
ABDOMINAL PAIN: 0
EYE PAIN: 0

## 2020-04-06 NOTE — PROGRESS NOTES
suspected rheuamtoid arthritis. +RF, >6 weeks duration, + MCP, MTP, and mid foot DJD on xray  - prior tx: sulfasalazine (leukopenia, fatigue)  - avoiding arava, azathioprine, methotrexate b/c ETOH use   - Enbrel 50 mg subcu weekly- received denial. Will  resubmit. Neuropathy  - herediatry vs ETOH vs inflammatory myopathy  - prior EMG as above    Osteoarthritis of multiple joints  - left foot injury. Suspected had OA from RA    Raynaud's phenomenon   - continue conservative measures    Leukopenia   - sulfasalazine stopped    Medication monitoring   - CBC, CMP, sed rate, CRP q 4-6 months      No follow-ups on file. This visit was completed virtually using "[employer]. Electronically signed by GADIEL Pollack CNP on 4/6/2020 at 8:01 AM    New Prescriptions    No medications on file       Thank you for allowing me to participate in the care of this patient. Please call if there are any questions.

## 2020-04-15 ENCOUNTER — TELEPHONE (OUTPATIENT)
Dept: RHEUMATOLOGY | Age: 70
End: 2020-04-15

## 2020-04-20 RX ORDER — ETANERCEPT 50 MG/ML
50 SOLUTION SUBCUTANEOUS WEEKLY
Qty: 4 SYRINGE | Refills: 5 | Status: SHIPPED | OUTPATIENT
Start: 2020-04-20 | End: 2020-11-19

## 2020-04-23 NOTE — TELEPHONE ENCOUNTER
Patient left a message stating the Enbrel was going to cost him $8000. I called [facility] and spoke with Mera and she states it was 700 this month and probably more next month when he gets in the donut hole and until he pays around $2500 then he can qualify for some hardship progrom. I spoke with the rep for Laurence Hernandez) and she advised me to have him call [phone]    I attempted to call  and inform LVM with detailed information.

## 2020-06-10 ENCOUNTER — OFFICE VISIT (OUTPATIENT)
Dept: RHEUMATOLOGY | Age: 70
End: 2020-06-10
Payer: MEDICARE

## 2020-06-10 VITALS
BODY MASS INDEX: 23.15 KG/M2 | HEIGHT: 75 IN | TEMPERATURE: 97.6 F | WEIGHT: 186.2 LBS | DIASTOLIC BLOOD PRESSURE: 78 MMHG | OXYGEN SATURATION: 96 % | SYSTOLIC BLOOD PRESSURE: 118 MMHG | HEART RATE: 72 BPM

## 2020-06-10 PROCEDURE — 99214 OFFICE O/P EST MOD 30 MIN: CPT | Performed by: INTERNAL MEDICINE

## 2020-06-10 RX ORDER — HYDROXYCHLOROQUINE SULFATE 200 MG/1
200 TABLET, FILM COATED ORAL 2 TIMES DAILY
Qty: 60 TABLET | Refills: 3 | Status: SHIPPED | OUTPATIENT
Start: 2020-06-10 | End: 2020-10-19

## 2020-06-10 ASSESSMENT — ENCOUNTER SYMPTOMS
WHEEZING: 0
EYE PAIN: 0
EYE REDNESS: 0
SHORTNESS OF BREATH: 0
VOMITING: 0
NAUSEA: 0
EYES NEGATIVE: 1
DIARRHEA: 0
CONSTIPATION: 0
COUGH: 0

## 2020-06-10 NOTE — PROGRESS NOTES
Arthritis     Gout     Vitamin B12 deficiency        PAST SURGICAL HISTORY      Past Surgical History:   Procedure Laterality Date    BACK SURGERY      COLONOSCOPY      EYE SURGERY      FOOT SURGERY      [place] COLON CA SCRN NOT  [address] IND Left 11/3/2017    COLONOSCOPY performed by Simeon Contreras MD at [facility]       FAMILY HISTORY      Family History   Problem Relation Age of Onset    Other Mother         Gout    Heart Disease Mother     Lupus Mother     Heart Disease Father     Cancer Father         pancreatic    No Known Problems Sister     High Cholesterol Brother     Arthritis Brother        SOCIAL HISTORY      Social History     Tobacco History     Smoking Status  Never Smoker    Smokeless Tobacco Use  Never Used          Alcohol History     Alcohol Use Status  Yes Drinks/Week  3 Glasses of wine per week Amount  3.0 standard drinks of alcohol/wk          Drug Use     Drug Use Status  No          Sexual Activity     Sexually Active  Yes Partners  Female                ALLERGIES   No Known Allergies    CURRENT MEDICATIONS      Current Outpatient Medications   Medication Sig Dispense Refill    Cyanocobalamin (VITAMIN B-12) 1000 MCG SUBL   0    Etanercept (ENBREL SURECLICK) 50 MG/ML SOAJ Inject 50 mg into the skin once a week (Patient not taking: Reported on 6/10/2020) 4 Syringe 5    Ascorbic Acid (VITAMIN C) 500 MG CAPS Take 500 mg by mouth daily      Omega-3 Fatty Acids (FISH OIL) 1000 MG CAPS Take 1 capsule by mouth 3 times daily (Patient not taking: Reported on 6/10/2020) 90 capsule 5     No current facility-administered medications for this visit. PHYSICAL EXAMINATION / OBJECTIVE     Objective:  /78 (Site: Left Upper Arm, Position: Sitting, Cuff Size: Large Adult)   Pulse 72   Temp 97.6 °F (36.4 °C)   Ht 6' 2.8\" (1.9 m)   Wt 186 lb 3.2 oz (84.5 kg)   SpO2 96%   BMI 23.40 kg/m²     Physical Exam  Vitals signs reviewed.    Constitutional:       General: He is not in acute

## 2020-10-19 RX ORDER — HYDROXYCHLOROQUINE SULFATE 200 MG/1
TABLET, FILM COATED ORAL
Qty: 60 TABLET | Refills: 3 | Status: SHIPPED | OUTPATIENT
Start: 2020-10-19 | End: 2020-11-18 | Stop reason: SDUPTHER

## 2020-11-18 ENCOUNTER — OFFICE VISIT (OUTPATIENT)
Dept: RHEUMATOLOGY | Age: 70
End: 2020-11-18
Payer: MEDICARE

## 2020-11-18 VITALS
DIASTOLIC BLOOD PRESSURE: 78 MMHG | BODY MASS INDEX: 23.4 KG/M2 | SYSTOLIC BLOOD PRESSURE: 118 MMHG | HEART RATE: 79 BPM | TEMPERATURE: 96.5 F | OXYGEN SATURATION: 98 % | HEIGHT: 75 IN

## 2020-11-18 PROCEDURE — 99214 OFFICE O/P EST MOD 30 MIN: CPT | Performed by: INTERNAL MEDICINE

## 2020-11-18 RX ORDER — HYDROXYCHLOROQUINE SULFATE 200 MG/1
200 TABLET, FILM COATED ORAL 2 TIMES DAILY
Qty: 180 TABLET | Refills: 1 | Status: SHIPPED | OUTPATIENT
Start: 2020-11-18

## 2020-11-18 ASSESSMENT — ENCOUNTER SYMPTOMS
CONSTIPATION: 0
EYE PAIN: 0
SHORTNESS OF BREATH: 0
WHEEZING: 0
DIARRHEA: 0
EYES NEGATIVE: 1
EYE REDNESS: 0
NAUSEA: 0
COUGH: 0
VOMITING: 0

## 2020-11-18 NOTE — PROGRESS NOTES
[facility]  Rheumatology Adult Follow up Note         Date Of Service: 11/18/2020  Provider: Chuyita Ibarra DO    Name: Clarence Vaughan   MRN: [id number]    Chief Complaint(s)      Chief Complaint   Patient presents with    Follow-up     4 month- rheumatoid arthritis      History of Present illness (HPI)    Clarence Vaughan   is a(n)70 y.o. male with a hx of h/o Vitamin B12, Def, rheumatoid arthritis, senorimotor axonal neuropathy  Here for the Rheumatoid arthritis.      etoh - about 24-27 oz per day. Rheumatoid arthritis / osteoarthritis of multiple joints- Tolerating plaquenil -plaquenil eye exam oct 2020 at [facility]. decreased cramping of hands. Pain currently bilatearl hadn, knees, ankles, . Pain 2/10 over the past.  Worse in the mornings. :constant bilateral ankle tightness. Timing: mornings, Stiffness ~ 20 min an AM. Swelling - Rt hands/MCPs Alleviating factors: movement. Feet -taking off shoes. Aggravating: feet -wearing shoes. Hands - increased use of hands. Raynaud stable. Neuropathy of feet stable - unchanbed. Review of Systems    Review of Systems   Constitutional: Positive for fatigue. Negative for diaphoresis and fever. HENT: Negative for congestion, hearing loss and nosebleeds. Eyes: Negative. Negative for pain and redness. Respiratory: Negative for cough, shortness of breath and wheezing. Cardiovascular: Negative. Negative for chest pain. Gastrointestinal: Negative for constipation, diarrhea, nausea and vomiting. Genitourinary: Negative for difficulty urinating, frequency and hematuria. Musculoskeletal: Positive for arthralgias and joint swelling. Negative for myalgias. Skin: Negative for rash. Neurological: Negative for dizziness, weakness and headaches. Hematological: Does not bruise/bleed easily. Psychiatric/Behavioral: Negative for sleep disturbance. The patient is not nervous/anxious.               PAST MEDICAL HISTORY Past Medical History:   Diagnosis Date    Arthritis     Gout     Vitamin B12 deficiency        PAST SURGICAL HISTORY      Past Surgical History:   Procedure Laterality Date    BACK SURGERY      COLONOSCOPY      EYE SURGERY      FOOT SURGERY      [place] COLON CA SCRN NOT  [address] IND Left 11/3/2017    COLONOSCOPY performed by Joleen Ponce MD at [facility]       FAMILY HISTORY      Family History   Problem Relation Age of Onset    Other Mother         Gout    Heart Disease Mother     Lupus Mother     Heart Disease Father     Cancer Father         pancreatic    No Known Problems Sister     High Cholesterol Brother     Arthritis Brother        SOCIAL HISTORY      Social History     Tobacco History     Smoking Status  Never Smoker    Smokeless Tobacco Use  Never Used          Alcohol History     Alcohol Use Status  Yes Drinks/Week  3 Glasses of wine per week Amount  3.0 standard drinks of alcohol/wk          Drug Use     Drug Use Status  No          Sexual Activity     Sexually Active  Yes Partners  Female                ALLERGIES   No Known Allergies    CURRENT MEDICATIONS      Current Outpatient Medications   Medication Sig Dispense Refill    hydroxychloroquine (PLAQUENIL) 200 MG tablet take 1 tablet by mouth twice a day 60 tablet 3    Ascorbic Acid (VITAMIN C) 500 MG CAPS Take 500 mg by mouth daily      Cyanocobalamin (VITAMIN B-12) 1000 MCG SUBL   0    Etanercept (ENBREL SURECLICK) 50 MG/ML SOAJ Inject 50 mg into the skin once a week (Patient not taking: Reported on 11/18/2020) 4 Syringe 5    Omega-3 Fatty Acids (FISH OIL) 1000 MG CAPS Take 1 capsule by mouth 3 times daily (Patient not taking: Reported on 6/10/2020) 90 capsule 5     No current facility-administered medications for this visit.         PHYSICAL EXAMINATION / OBJECTIVE     Objective:  /78 (Site: Left Upper Arm, Position: Sitting, Cuff Size: Large Adult)   Pulse 79   Temp 96.5 °F (35.8 °C)   Ht 6' 2.8\" (1.9 m)   SpO2 98%   BMI 23.40 kg/m²     Physical Exam  Vitals signs reviewed. Constitutional:       General: He is not in acute distress. Appearance: He is well-developed. He is not diaphoretic. HENT:      Head: Normocephalic and atraumatic. Right Ear: External ear normal.      Left Ear: External ear normal.   Eyes:      Conjunctiva/sclera: Conjunctivae normal.      Pupils: Pupils are equal, round, and reactive to light. Neck:      Musculoskeletal: Neck supple. Cardiovascular:      Rate and Rhythm: Normal rate and regular rhythm. Heart sounds: Normal heart sounds. Pulmonary:      Effort: Pulmonary effort is normal.      Breath sounds: Normal breath sounds. Musculoskeletal: Normal range of motion. Lymphadenopathy:      Cervical: No cervical adenopathy. Skin:     General: Skin is warm and dry. Comments: cyanosis and redness bilateral feet  NORMAL hand nail capillaries. Neurological:      Mental Status: He is alert and oriented to person, place, and time. Musculoskeletal:     Normal gait. Strength 5/5 in biceps, triceps, hips, knees,      Upper extremities:   Shoulders: , elbows, wrists - non-tender, no swelling   Hands:    Tender bilat 2-3 MCPs,PIPS BILAT. Synovitis  MCPs 2-3 bilat,   - ROM limited on right --- 4-5 flex deformity,    - mild ulnar deviation of right fingers. Lower extremities:  Hips: , Knees :Non-tender, No swelling   Ankles:  + crepitus bilat. Tender bilat. Feet: Tender -- MTPs bilat, mid foot bilat.    + hypertorphic bone change left mid foot. + hammer toes bilat.  + pes cavus      RAPID3 Composite Score MDHAQ (0-10) + Patient pain VAS (0-10): + Patient global assessment VAS (0-10):     11/18/2020 --- RAPID 3:  2 + 2+ 2 = 6       Remission: <3  Low Disease Activity: <6  Moderate Disease Activity: >=6 and <=12  High Disease Activity: >12    LABS        CBC  Lab Results   Component Value Date    WBC 4.2 01/27/2020    RBC 4.41 01/27/2020    HGB 15.3 01/27/2020 HCT 45.6 01/27/2020    .4 01/27/2020    MCH 34.7 01/27/2020    MCHC 33.6 01/27/2020    RDW 12.9 03/01/2018     01/27/2020       CMP  Lab Results   Component Value Date    CALCIUM 9.7 01/27/2020    LABALBU 4.7 01/27/2020    PROT 7.2 01/27/2020     01/27/2020    K 4.6 01/27/2020    CO2 27 01/27/2020     01/27/2020    BUN 13 01/27/2020    CREATININE 0.7 01/27/2020    ALKPHOS 44 01/27/2020    ALT 16 01/27/2020    AST 19 01/27/2020       HgBA1c: No components found for: HGBA1C    Lab Results   Component Value Date    TSH 1.220 04/13/2019     Lab Results   Component Value Date    VITD25 50 07/31/2018         Lab Results   Component Value Date    ANASCRN None Detected 09/13/2017     Lab Results   Component Value Date    SSA SEE BELOW 05/14/2019     Lab Results   Component Value Date    SSB 0 05/14/2019     No results found for: ANTI-SMITH  Lab Results   Component Value Date    DSDNAAB SEE BELOW 05/14/2019      No results found for: ANTIRNP  No results found for: C3, C4  Lab Results   Component Value Date    CCPAB 4 07/26/2019     Lab Results   Component Value Date    RF 36 (H) 09/13/2017       No components found for: CANCASCRN, APANCASCRN  Lab Results   Component Value Date    SEDRATE 3 01/27/2020     Lab Results   Component Value Date    CRP < 0.03 01/27/2020       RADIOLOGY:         ASSESSMENT/PLAN    Assessment   Plan   1. Rheumatoid arthritis involving both ankles with positive rheumatoid factor (HCC)  - suspected rheumatoid arthritis + Rf , > 6 wks, > MCP , MTP, and mid foot DJD on x-ray,    - stop sulfasalazine to 1500mg bid. -- leukopenia, fatigue.    - avoiding Arava, Azathioprine, methotrexate -- b/c EtOH use  Up 32 Oz of wine per day. - TB gold - Negative    - plaquenil 200mg bid. 6-   - if labs stable will plan on starting low dosing SSZ b/c prior intolerance with 1500mg bid b/c leukopenia and fatigue.      - declined prednisone use.      2. Neuropathy --    - hereditary vs EtOH vs inflammatory myopathy    - prior EMG      3. Osteoarthritis of multiple joints, unspecified osteoarthritis type   - left foot injury. Suspected hand OA from RA    4. Raynaud's phenomenon   - continue conservative treatment measures. 5. Medication monitoring:   - CBC, CMP, ESR, CRP q 4 weeks          No follow-ups on file. Electronically signed by Wilfredo Buckley DO on 11/18/2020 at 11:59 AM    New Prescriptions    No medications on file       Thank you for allowing me to participate in the care of this patient. Please call if there are any questions.

## 2020-11-19 LAB
ABSOLUTE BASO #: 0 X10E9/L (ref 0–0.2)
ABSOLUTE EOS #: 0 X10E9/L (ref 0–0.4)
ABSOLUTE LYMPH #: 1 X10E9/L (ref 1–3.5)
ABSOLUTE MONO #: 0.4 X10E9/L (ref 0–0.9)
ABSOLUTE NEUT #: 3.1 X10E9/L (ref 1.5–6.6)
ALBUMIN SERPL-MCNC: 4.6 G/DL (ref 3.2–5.3)
ALK PHOSPHATASE: 41 U/L (ref 39–130)
ALT SERPL-CCNC: 13 U/L (ref 0–40)
ANION GAP SERPL CALCULATED.3IONS-SCNC: 7 MMOL/L (ref 5–15)
AST SERPL-CCNC: 22 U/L (ref 0–41)
BASOPHILS RELATIVE PERCENT: 0.4 %
BILIRUB SERPL-MCNC: 0.9 MG/DL (ref 0.3–1.2)
BUN BLDV-MCNC: 12 MG/DL (ref 5–27)
C-REACTIVE PROTEIN: <0.1 MG/DL (ref 0–0.74)
CALCIUM SERPL-MCNC: 9.5 MG/DL (ref 8.5–10.5)
CHLORIDE BLD-SCNC: 102 MMOL/L (ref 98–109)
CO2: 30 MMOL/L (ref 22–32)
CREAT SERPL-MCNC: 0.87 MG/DL (ref 0.6–1.3)
EGFR AFRICAN AMERICAN: >60 ML/MIN/1.73SQ.M
EGFR IF NONAFRICAN AMERICAN: >60 ML/MIN/1.73SQ.M
EOSINOPHILS RELATIVE PERCENT: 0.4 %
GLUCOSE: 94 MG/DL (ref 65–99)
HCT VFR BLD CALC: 45 % (ref 39–49)
HEMOGLOBIN: 15.5 G/DL (ref 13–17)
LYMPHOCYTE %: 21.6 %
MCH RBC QN AUTO: 34.4 PG (ref 27–34)
MCHC RBC AUTO-ENTMCNC: 34.4 G/DL (ref 32–36)
MCV RBC AUTO: 100 FL (ref 80–100)
MONOCYTES # BLD: 9.3 %
NEUTROPHILS RELATIVE PERCENT: 68.3 %
PDW BLD-RTO: 13.1 % (ref 11.5–15)
PLATELETS: 211 X10E9/L (ref 150–450)
PMV BLD AUTO: 7.8 FL (ref 7–12)
POTASSIUM SERPL-SCNC: 5.5 MMOL/L (ref 3.5–5)
RBC: 4.5 X10E12/L (ref 4.1–5.7)
SEDIMENTATION RATE, ERYTHROCYTE: 3 MM/H (ref 0–20)
SODIUM BLD-SCNC: 139 MMOL/L (ref 134–146)
TOTAL PROTEIN: 7.1 G/DL (ref 6–8)
WBC: 4.5 X10E9/L (ref 4–11)

## 2020-11-19 RX ORDER — SULFASALAZINE 500 MG/1
1000 TABLET ORAL 2 TIMES DAILY
Qty: 120 TABLET | Refills: 0 | Status: SHIPPED | OUTPATIENT
Start: 2020-11-19 | End: 2021-03-24 | Stop reason: SDUPTHER

## 2020-11-19 NOTE — PROGRESS NOTES
Diagnosis Orders   1. Rheumatoid arthritis involving both ankles with positive rheumatoid factor (HCC)  sulfaSALAzine (AZULFIDINE) 500 MG tablet     2.  Med monitoring  - repeat labs q 4 weeks with SSZ start 11/19/2020

## 2021-01-11 DIAGNOSIS — M05.772 RHEUMATOID ARTHRITIS INVOLVING BOTH ANKLES WITH POSITIVE RHEUMATOID FACTOR (HCC): ICD-10-CM

## 2021-01-11 DIAGNOSIS — M05.771 RHEUMATOID ARTHRITIS INVOLVING BOTH ANKLES WITH POSITIVE RHEUMATOID FACTOR (HCC): ICD-10-CM

## 2021-01-11 RX ORDER — SULFASALAZINE 500 MG/1
1000 TABLET ORAL 2 TIMES DAILY
Qty: 120 TABLET | Refills: 0 | OUTPATIENT
Start: 2021-01-11

## 2021-01-11 NOTE — TELEPHONE ENCOUNTER
Genia Garcia called requesting a refill on the following medications:  Requested Prescriptions     Pending Prescriptions Disp Refills    sulfaSALAzine (AZULFIDINE) 500 MG tablet 120 tablet 0     Sig: Take 2 tablets by mouth 2 times daily     Pharmacy verified: [facility] on [address]      Date of last visit: 11/18/2020  Date of next visit (if applicable): 4/75/1104

## 2021-03-24 ENCOUNTER — OFFICE VISIT (OUTPATIENT)
Dept: RHEUMATOLOGY | Age: 71
End: 2021-03-24
Payer: MEDICARE

## 2021-03-24 VITALS
TEMPERATURE: 97.5 F | HEIGHT: 75 IN | OXYGEN SATURATION: 95 % | BODY MASS INDEX: 23.35 KG/M2 | DIASTOLIC BLOOD PRESSURE: 86 MMHG | SYSTOLIC BLOOD PRESSURE: 136 MMHG | HEART RATE: 77 BPM | WEIGHT: 187.8 LBS

## 2021-03-24 DIAGNOSIS — I73.00 RAYNAUD'S PHENOMENON WITHOUT GANGRENE: ICD-10-CM

## 2021-03-24 DIAGNOSIS — M05.771 RHEUMATOID ARTHRITIS INVOLVING BOTH ANKLES WITH POSITIVE RHEUMATOID FACTOR (HCC): Primary | ICD-10-CM

## 2021-03-24 DIAGNOSIS — G62.9 NEUROPATHY: ICD-10-CM

## 2021-03-24 DIAGNOSIS — Z51.81 MEDICATION MONITORING ENCOUNTER: ICD-10-CM

## 2021-03-24 DIAGNOSIS — M05.772 RHEUMATOID ARTHRITIS INVOLVING BOTH ANKLES WITH POSITIVE RHEUMATOID FACTOR (HCC): Primary | ICD-10-CM

## 2021-03-24 DIAGNOSIS — M15.9 OSTEOARTHRITIS OF MULTIPLE JOINTS, UNSPECIFIED OSTEOARTHRITIS TYPE: ICD-10-CM

## 2021-03-24 PROCEDURE — 99214 OFFICE O/P EST MOD 30 MIN: CPT | Performed by: NURSE PRACTITIONER

## 2021-03-24 RX ORDER — SULFASALAZINE 500 MG/1
1000 TABLET ORAL 2 TIMES DAILY
Qty: 120 TABLET | Refills: 0 | Status: SHIPPED | OUTPATIENT
Start: 2021-03-24

## 2021-03-24 ASSESSMENT — ENCOUNTER SYMPTOMS
CONSTIPATION: 0
TROUBLE SWALLOWING: 0
EYE PAIN: 0
SHORTNESS OF BREATH: 0
BACK PAIN: 0
COUGH: 0
ABDOMINAL PAIN: 0
NAUSEA: 0
DIARRHEA: 0
EYE ITCHING: 0

## 2021-03-24 NOTE — PROGRESS NOTES
[facility] RHEUMATOLOGY FOLLOW UP NOTE       Date Of Service: 3/24/2021  Provider: GADIEL Shaikh - ALEXANDRE    Name: Simi Coffey   MRN: [id number]    Chief Complaint(s)     Chief Complaint   Patient presents with    Follow-up     4 month         History of Present Illness (HPI)     Simi Coffey  is a(n)70 y.o. male with a hx of h/o vitamin B12 def, rheumatoid arthritis, sensorimotor axonal neuropathy here for the f/u evaluation of rheumatoid arthritis. Interval hx:    - only took sulfasalazine for 1 month prior to running out- no labs    pain affecting the hands, knees, ankles  Pain on a scale 0-10: 2/10  Type of pain: stiffness, sore  Timing:mornings  Aggravating factors: feet: wearing shows. Hands: increased use, inactivity  Alleviating factors: movement. Feet: taking off shoes    Associated symptoms:  denies swelling/  Redness/ warmth, + AM stiffness lasting ~ 15-20 minutes      REVIEW OF SYSTEMS: (ROS)    Review of Systems   Constitutional: Positive for fatigue. Negative for fever and unexpected weight change. HENT: Negative for congestion and trouble swallowing. Eyes: Negative for pain and itching. Respiratory: Negative for cough and shortness of breath. Cardiovascular: Negative for chest pain and leg swelling. Gastrointestinal: Negative for abdominal pain, constipation, diarrhea and nausea. Endocrine: Negative for cold intolerance and heat intolerance. Genitourinary: Negative for difficulty urinating, frequency and urgency. Musculoskeletal: Positive for arthralgias and joint swelling. Negative for back pain. Skin: Negative for rash. Neurological: Negative for dizziness, weakness, numbness and headaches. Psychiatric/Behavioral: The patient is not nervous/anxious.         PAST MEDICAL HISTORY      Past Medical History:   Diagnosis Date    Arthritis     Gout     Vitamin B12 deficiency        PAST SURGICAL HISTORY      Past Surgical History:   Procedure Laterality Date    motion and neck supple. Cardiovascular:      Rate and Rhythm: Normal rate and regular rhythm. Pulmonary:      Effort: Pulmonary effort is normal.      Breath sounds: Normal breath sounds. Abdominal:      Palpations: Abdomen is soft. Tenderness: There is no abdominal tenderness. Skin:     General: Skin is warm and dry. Findings: No rash. Neurological:      Mental Status: He is alert and oriented to person, place, and time. Deep Tendon Reflexes: Reflexes are normal and symmetric. Psychiatric:         Thought Content:  Thought content normal.         Upper extremities:    SHOULDERS nontender, no swelling ,   ELBOWS nontender, no swelling,   WRISTS nontender, no swelling,   HANDS/FINGERS    MCPs tender and swelling bilat 2,3     PIPs tender bilat    Lower extremities:  HIPS nontender  KNEES nontender, no swelling  ANKLES tender, no swelling   FEET : + bilat MTPs, + hammer toes          LABS    CBC  Lab Results   Component Value Date    WBC 4.5 11/18/2020    WBC 4.2 01/27/2020    RBC 4.50 11/18/2020    HGB 15.5 11/18/2020    HCT 45.0 11/18/2020     11/18/2020    MCH 34.4 11/18/2020    MCHC 34.4 11/18/2020    RDW 13.1 11/18/2020     11/18/2020     01/27/2020       CMP  Lab Results   Component Value Date    CALCIUM 9.5 11/18/2020    LABALBU 4.6 11/18/2020    PROT 7.1 11/18/2020     11/18/2020    K 5.5 11/18/2020    CO2 30 11/18/2020     11/18/2020    BUN 12 11/18/2020    CREATININE 0.87 11/18/2020    ALKPHOS 41 11/18/2020    ALKPHOS 44 01/27/2020    ALT 13 11/18/2020    AST 22 11/18/2020       HgBA1c: No components found for: HGBA1C    Lab Results   Component Value Date    VITD25 50 07/31/2018         Lab Results   Component Value Date    ANASCRN None Detected 09/13/2017     Lab Results   Component Value Date    SSA SEE BELOW 05/14/2019     Lab Results   Component Value Date    SSB 0 05/14/2019     No results found for: [facility]  Lab Results   Component Value Date    DSDNAAB SEE BELOW 05/14/2019      No results found for: ANTIRNP  No results found for: C3, C4  Lab Results   Component Value Date    CCPAB 4 07/26/2019     Lab Results   Component Value Date    RF 36 (H) 09/13/2017       No components found for: Camelia Gulshan Results   Component Value Date    SEDRATE 3 11/18/2020     Lab Results   Component Value Date    CRP <0.1 11/18/2020       RADIOLOGY:         ASSESSMENT/PLAN    Assessment   Plan     Rheumatoid arthritis involving both ankles with positive rheumatoid factor  - suspected rheumatoid arthritis, +RF, > 6 weeks, MCPs, MTPs and midfoot DJD on xray. - prior tx: sulfasalazine 1500 mg BID (leukopenia, fatigue)  - avoiding arava, aza, METHOTREXATE due to ETOH use   - plaquenil 200 mg BID   - restart sulfasalazine 1000 mg BID    Neuropathy  - hereditary vs ETOH vs inflammatory myopathy   - prior EMG    Osteoarthritis of multiple joints  - left foot injury. Suspected hand OA from RA    Raynauds phenomenon    - continue conservative treatment measures    Medication monitoring   - CBC, CMP, sed rate, CRP q 4 weeks x3 w/ SSZ restart      Return in about 4 months (around 7/24/2021). Electronically signed by GADIEL [place] CNP on 3/24/2021 at 11:29 AM    New Prescriptions    No medications on file       Thank you for allowing me to participate in the care of this patient. Please call if there are any questions.

## 2021-04-06 ENCOUNTER — OFFICE VISIT (OUTPATIENT)
Dept: FAMILY MEDICINE CLINIC | Age: 71
End: 2021-04-06
Payer: MEDICARE

## 2021-04-06 VITALS
HEART RATE: 82 BPM | SYSTOLIC BLOOD PRESSURE: 134 MMHG | RESPIRATION RATE: 14 BRPM | WEIGHT: 183 LBS | OXYGEN SATURATION: 97 % | DIASTOLIC BLOOD PRESSURE: 80 MMHG | TEMPERATURE: 96.6 F | BODY MASS INDEX: 22.99 KG/M2

## 2021-04-06 DIAGNOSIS — E55.9 VITAMIN D DEFICIENCY: ICD-10-CM

## 2021-04-06 DIAGNOSIS — R93.3 ABNORMAL FINDINGS ON DIAGNOSTIC IMAGING OF OTHER PARTS OF DIGESTIVE TRACT: ICD-10-CM

## 2021-04-06 DIAGNOSIS — Z13.29 SCREENING FOR THYROID DISORDER: ICD-10-CM

## 2021-04-06 DIAGNOSIS — G62.9 NEUROPATHY: ICD-10-CM

## 2021-04-06 DIAGNOSIS — Z00.00 ROUTINE GENERAL MEDICAL EXAMINATION AT A HEALTH CARE FACILITY: Primary | ICD-10-CM

## 2021-04-06 DIAGNOSIS — Z00.00 ENCOUNTER FOR MEDICARE ANNUAL WELLNESS EXAM: ICD-10-CM

## 2021-04-06 PROCEDURE — 99397 PER PM REEVAL EST PAT 65+ YR: CPT | Performed by: NURSE PRACTITIONER

## 2021-04-06 ASSESSMENT — PATIENT HEALTH QUESTIONNAIRE - PHQ9: SUM OF ALL RESPONSES TO PHQ QUESTIONS 1-9: 0

## 2021-04-06 NOTE — PROGRESS NOTES
Medicare Annual Wellness Visit  Name: Peyton Rodriguez Date: 2021   MRN: [id number] Sex: Male   Age: 78 y.o. Ethnicity: Non-/Non    : 1950 Race: Jeanette Cuellar is here for Medicare AWV (general yearly check up)    With regard to his health habits he states he  eats 2 meals per day and 0 snacks per day. [place] exercise regularly:  His physical activities include: normal daily activity. He does take over the counter vitamins. He never had a blood transfusion or tattoo. He wears seatbelts when driving a car. He does not talk or text on the phone while driving. He works 0 hours a week. He is content with his life. He is . Current on vaccinations - second COVID shot will be Friday. He is  a smoker. He does consume alcoholic beverages on a regular basis. He has had his screening colonoscopy. - due again  - done by Mariella Fleming. Screenings for behavioral, psychosocial and functional/safety risks, and cognitive dysfunction are all negative except as indicated below. These results, as well as other patient data from the [address] form, are documented in Flowsheets linked to this Encounter. No Known Allergies      Prior to Visit Medications    Medication Sig Taking?  Authorizing Provider   sulfaSALAzine (AZULFIDINE) 500 MG tablet Take 2 tablets by mouth 2 times daily Yes GADIEL Patel - ALEXANDRE   hydroxychloroquine (PLAQUENIL) 200 MG tablet Take 1 tablet by mouth 2 times daily Yes Cindy Latif DO   Ascorbic Acid (VITAMIN C) 500 MG CAPS Take 500 mg by mouth daily Yes Historical Provider, MD   Cyanocobalamin (VITAMIN B-12) 1000 MCG SUBL  Yes Historical Provider, MD         Past Medical History:   Diagnosis Date    Arthritis     Gout     Neuropathy     Vitamin B12 deficiency        Past Surgical History:   Procedure Laterality Date    BACK SURGERY      COLONOSCOPY      EYE SURGERY      FOOT SURGERY      UT COLON CA SCRN NOT  W 14 IND Left 11/3/2017    COLONOSCOPY performed by Britney Hawk MD at [facility]         Family History   Problem Relation Age of Onset    Other Mother         Gout    Heart Disease Mother     Lupus Mother     Heart Disease Father     Cancer Father         pancreatic    No Known Problems Sister     High Cholesterol Brother     Arthritis Brother        CareTeam (Including outside providers/suppliers regularly involved in providing care):   Patient Care Team:  Moshe Owusu DO as PCP - General (Family Medicine)  Moshe Owusu DO as PCP - [facility] Empaneled Provider  GADIEL Portillo - CNP as Nurse Practitioner (Certified Nurse Practitioner)    Wt Readings from Last 3 Encounters:   04/06/21 183 lb (83 kg)   03/24/21 187 lb 12.8 oz (85.2 kg)   06/10/20 186 lb 3.2 oz (84.5 kg)     Vitals:    04/06/21 1050   BP: 134/80   Site: Left Upper Arm   Position: Sitting   Cuff Size: Medium Adult   Pulse: 82   Resp: 14   Temp: 96.6 °F (35.9 °C)   TempSrc: Skin   SpO2: 97%   Weight: 183 lb (83 kg)     Body mass index is 22.99 kg/m². Based upon direct observation of the patient, evaluation of cognition reveals recent and remote memory intact. Patient's complete Health Risk Assessment and screening values have been reviewed and are found in Flowsheets. The following problems were reviewed today and where indicated follow up appointments were made and/or referrals ordered.     Positive Risk Factor Screenings with Interventions:            General Health and ACP:     Advance Directives     Power of  Living Will ACP-Advance Directive ACP-Power of     Not on File Not on File Not on File Not on File      General Health Risk Interventions:          Personalized Preventive Plan   Current Health Maintenance Status  Immunization History   Administered Date(s) Administered    COVID-19, Moderna, PF, 100mcg/0.5mL 03/12/2021    Influenza Virus Vaccine 10/09/2015, 10/20/2016    Influenza, High Dose (Fluzone 65 yrs and older) 11/10/2017, 10/10/2020    Influenza, Quadv, IM, PF (6 mo and older Fluzone, Flulaval, Fluarix, and 3 yrs and older Afluria) 11/12/2018    Influenza, Triv, inactivated, subunit, adjuvanted, IM (Fluad 65 yrs and older) 10/04/2019    Pneumococcal Conjugate 13-valent ([id number]) 11/10/2017    Pneumococcal Polysaccharide ([id number]) 11/12/2018    Tdap (Boostrix, Adacel) 06/21/2017    Zoster Live (Zostavax) 04/17/2015        Health Maintenance   Topic Date Due    Annual Wellness Visit (AWV)  Never done    COVID-19 Vaccine (2 - Moderna 2-dose series) 04/09/2021    Lipid screen  07/26/2024    DTaP/Tdap/Td vaccine (2 - Td) 06/21/2027    Colon cancer screen colonoscopy  11/03/2027    Flu vaccine  Completed    Pneumococcal 65+ years Vaccine  Completed    Hepatitis C screen  Completed    Hepatitis A vaccine  Aged Out    Hepatitis B vaccine  Aged Out    Hib vaccine  Aged Out    Meningococcal (ACWY) vaccine  Aged Out     Recommendations for frestyl Due: see orders and patient instructions/AVS.  . Recommended screening schedule for the next 5-10 years is provided to the patient in written form: see Patient Instructions/AVS.    Thuy Renee was seen today for medicare awv. Diagnoses and all orders for this visit:    Routine general medical examination at a health care facility    Encounter for Medicare annual wellness exam  -     Lipid Panel; Future  -     Hepatic Function Panel; Future  -     TSH without Reflex; Future  -     Vitamin D 25 Hydroxy; Future  -     Magnesium; Future    Neuropathy  -     Lipid Panel; Future  -     Hepatic Function Panel; Future  -     TSH without Reflex; Future  -     Vitamin D 25 Hydroxy; Future  -     Magnesium; Future    Vitamin D deficiency  -     Vitamin D 25 Hydroxy; Future    Abnormal findings on diagnostic imaging of other parts of digestive tract   -     Lipid Panel; Future    Screening for thyroid disorder  -     TSH without Reflex;  Future

## 2021-04-06 NOTE — PATIENT INSTRUCTIONS
Personalized Preventive Plan for Nurys Zhang  4/6/2021  Medicare offers a range of preventive health benefits. Some of the tests and screenings are paid in full while other may be subject to a deductible, co-insurance, and/or copay. Some of these benefits include a comprehensive review of your medical history including lifestyle, illnesses that may run in your family, and various assessments and screenings as appropriate. After reviewing your medical record and screening and assessments performed today your provider may have ordered immunizations, labs, imaging, and/or referrals for you. A list of these orders (if applicable) as well as your Preventive Care list are included within your After Visit Summary for your review. Other Preventive Recommendations:    · A preventive eye exam performed by an eye specialist is recommended every 1-2 years to screen for glaucoma; cataracts, macular degeneration, and other eye disorders. · A preventive dental visit is recommended every 6 months. · Try to get at least 150 minutes of exercise per week or 10,000 steps per day on a pedometer . · Order or download the FREE \"Exercise & Physical Activity: Your Everyday Guide\" from [employer]. Call 7-479.807.7851 or search [employer] online. · You need 5717-9072 mg of calcium and 3467-8226 IU of vitamin D per day. It is possible to meet your calcium requirement with diet alone, but a vitamin D supplement is usually necessary to meet this goal.  · When exposed to the sun, use a sunscreen that protects against both UVA and UVB radiation with an SPF of 30 or greater. Reapply every 2 to 3 hours or after sweating, drying off with a towel, or swimming. · Always wear a seat belt when traveling in a car. Always wear a helmet when riding a bicycle or motorcycle. Personalized Preventive Plan for Nurys Zhang - 4/6/2021  Medicare offers a range of preventive health benefits.  Some of the tests and screenings are paid in full while other may be subject to a deductible, co-insurance, and/or copay. Some of these benefits include a comprehensive review of your medical history including lifestyle, illnesses that may run in your family, and various assessments and screenings as appropriate. After reviewing your medical record and screening and assessments performed today your provider may have ordered immunizations, labs, imaging, and/or referrals for you. A list of these orders (if applicable) as well as your Preventive Care list are included within your After Visit Summary for your review. Other Preventive Recommendations:    A preventive eye exam performed by an eye specialist is recommended every 1-2 years to screen for glaucoma; cataracts, macular degeneration, and other eye disorders. A preventive dental visit is recommended every 6 months. Try to get at least 150 minutes of exercise per week or 10,000 steps per day on a pedometer . Order or download the FREE \"Exercise & Physical Activity: Your Everyday Guide\" from [employer]. Call 9-766.182.8292 or search [employer] online. You need 8178-0493 mg of calcium and 3303-9569 IU of vitamin D per day. It is possible to meet your calcium requirement with diet alone, but a vitamin D supplement is usually necessary to meet this goal.  When exposed to the sun, use a sunscreen that protects against both UVA and UVB radiation with an SPF of 30 or greater. Reapply every 2 to 3 hours or after sweating, drying off with a towel, or swimming. Always wear a seat belt when traveling in a car. Always wear a helmet when riding a bicycle or motorcycle. Patient Education        Well Visit, Over 72: Care Instructions  Overview     Well visits can help you stay healthy. Your doctor has checked your overall health and may have suggested ways to take good care of yourself. Your doctor also may have recommended tests. At home, you can help prevent illness with healthy eating, regular exercise, and other steps. Follow-up care is a key part of your treatment and safety. Be sure to make and go to all appointments, and call your doctor if you are having problems. It's also a good idea to know your test results and keep a list of the medicines you take. How can you care for yourself at home? Get screening tests that you and your doctor decide on. Screening helps find diseases before any symptoms appear. Eat healthy foods. Choose fruits, vegetables, whole grains, protein, and low-fat dairy foods. Limit fat, especially saturated fat. Reduce salt in your diet. Limit alcohol. If you are a man, have no more than 2 drinks a day or 14 drinks a week. If you are a woman, have no more than 1 drink a day or 7 drinks a week. Since alcohol affects older adults differently, you may want to limit alcohol even more. Or you may not want to drink at all. Get at least 30 minutes of exercise on most days of the week. Walking is a good choice. You also may want to do other activities, such as running, swimming, cycling, or playing tennis or team sports. Reach and stay at a healthy weight. This will lower your risk for many problems, such as obesity, diabetes, heart disease, and high blood pressure. Do not smoke. Smoking can make health problems worse. If you need help quitting, talk to your doctor about stop-smoking programs and medicines. These can increase your chances of quitting for good. Care for your mental health. It is easy to get weighed down by worry and stress. Learn strategies to manage stress, like deep breathing and mindfulness, and stay connected with your family and community. If you find you often feel sad or hopeless, talk with your doctor. Treatment can help. Talk to your doctor about whether you have any risk factors for sexually transmitted infections (STIs).  You can help prevent STIs if you wait to have sex with a new partner (or partners) until [facility] each been tested for STIs. It also helps if you use condoms (male or female condoms) and if you limit your sex partners to one person who only has sex with you. Vaccines are available for some STIs. If you think you may have a problem with alcohol or drug use, talk to your doctor. This includes prescription medicines (such as amphetamines and opioids) and illegal drugs (such as cocaine and methamphetamine). Your doctor can help you figure out what type of treatment is best for you. Protect your skin from too much sun. When you're outdoors from 10 a.m. to 4 p.m., stay in the shade or cover up with clothing and a hat with a wide brim. Wear sunglasses that block UV rays. Even when it's cloudy, put broad-spectrum sunscreen (SPF 30 or higher) on any exposed skin. See a dentist one or two times a year for checkups and to have your teeth cleaned. Wear a seat belt in the car. When should you call for help? Watch closely for changes in your health, and be sure to contact your doctor if you have any problems or symptoms that concern you. Where can you learn more? Go to https://chpepiceweb.healthInland Empire [employer]onentspartners. org and sign in to your [employer] account. Enter P245 in the [employer] box to learn more about \"Well Visit, Over 65: Care Instructions. \"     If you do not have an account, please click on the \"Sign Up Now\" link. Current as of: May 27, 2020               Content Version: 12.8  © 2006-2021 Healthwise, Incorporated. Care instructions adapted under license by [facility] [place] ([facility]). If you have questions about a medical condition or this instruction, always ask your healthcare professional. [place] [address] any warranty or liability for your use of this information. Patient Education        Learning About Vitamin D  Why is it important to get enough vitamin D? Your body needs vitamin D to absorb calcium.  Calcium keeps your bones and muscles, including your heart, healthy and strong. If your muscles don't get enough calcium, they can cramp, hurt, or feel weak. You may have long-term (chronic) muscle aches and pains. If you don't get enough vitamin D throughout life, you have an increased chance of having thin and brittle bones (osteoporosis) in your later years. Children who don't get enough vitamin D may not grow as much as others their age. They also have a chance of getting a rare disease called rickets. It causes weak bones. Vitamin D and calcium are added to many foods. And your body uses sunshine to make its own vitamin D. How much vitamin D do you need? The recommended daily allowance (RDA) for vitamin D is 600 IU (international units) every day for people ages 3 through 79. Adults 71 and older need 800 IU every day. Blood tests for vitamin D can check your vitamin D level. But there is no standard normal range used by all laboratories. You're likely getting enough vitamin D if your levels are in the range of 20 to 50 ng/mL. How can you get more vitamin D? Foods that contain vitamin D include:  Screven, tuna, and mackerel. These are some of the best foods to eat when you need to get more vitamin D. Cheese, egg yolks, and beef liver. These foods have vitamin D in small amounts. Milk, soy drinks, orange juice, yogurt, margarine, and some kinds of cereal have vitamin D added to them. Some people don't make vitamin D as well as others. They may have to take extra care in getting enough vitamin D. Things that reduce how much vitamin D your body makes include:  Dark skin, such as many  Americans have. Age, especially if you are older than 72. Digestive problems, such as Crohn's or celiac disease. Liver and kidney disease. Some people who do not get enough vitamin D may need supplements. Are there any risks from taking vitamin D? Too much vitamin D:  Can damage your kidneys. Can cause nausea and vomiting, constipation, and weakness.   Raises the amount of calcium in your blood. If this happens, you can get confused or have an irregular heart rhythm. Vitamin D may interact with other medicines. Tell your doctor about all of the medicines you take, including over-the-counter drugs, herbs, and pills. Tell your doctor about all of your current medical problems. Where can you learn more? Go to https://chpepiceweb.Infer. org and sign in to your [employer] account. Enter [id number] in the [facility] box to learn more about \"Learning About Vitamin D. \"     If you do not have an account, please click on the \"Sign Up Now\" link. Current as of: December 17, 2020               Content Version: 12.8  © 2006-2021 [employer]. Care instructions adapted under license by [facility] ([facility]). If you have questions about a medical condition or this instruction, always ask your healthcare professional. [place] [address] any warranty or liability for your use of this information. Patient Education        Neuropathic Pain: Care Instructions  Your Care Instructions     Neuropathic pain is caused by pressure on or damage to your nerves. It's often simply called nerve pain. Some people feel this type of pain all the time. For others, it comes and goes. Diabetes, shingles, or an injury can cause nerve pain. Many people say the pain feels sharp, burning, or stabbing. But some people feel it as a dull ache. In some cases, it makes your skin very sensitive. So touch, pressure, and other sensations that did not hurt before may now cause pain. It's important to know that this kind of pain is real and can affect your quality of life. It's also important to know that treatment can help. Treatment includes pain medicines, exercise, and physical therapy. Medicines can help reduce the number of pain signals that travel over the nerves. This can make the painful areas less sensitive.  It can also help you sleep better and improve your mood. But medicines are only one part of successful treatment. Most people do best with more than one kind of treatment. Your doctor may recommend that you try cognitive-behavioral therapy and stress management. Or, if needed, you may decide to try to quit smoking, lower your blood pressure, or better control blood sugar. These kinds of healthy changes can also make a difference. If you feel that your treatment is not working, talk to your doctor. And be sure to tell your doctor if you think you might be depressed or anxious. These are common problems that can also be treated. Follow-up care is a key part of your treatment and safety. Be sure to make and go to all appointments, and call your doctor if you are having problems. It's also a good idea to know your test results and keep a list of the medicines you take. How can you care for yourself at home? Be safe with medicines. Read and follow all instructions on the label. If the doctor gave you a prescription medicine for pain, take it as prescribed. If you are not taking a prescription pain medicine, ask your doctor if you can take an over-the-counter medicine. Save hard tasks for days when you have less pain. Follow a hard task with an easy task. And remember to take breaks. Relax, and reduce stress. You may want to try deep breathing or meditation. These can help. Keep moving. Gentle, daily exercise can help reduce pain. Your doctor or physical therapist can tell you what type of exercise is best for you. This may include walking, swimming, and stationary biking. It may also include stretches and range-of-motion exercises. Try heat, cold packs, and massage. Get enough sleep. Constant pain can make you more tired. If the pain makes it hard to sleep, talk with your doctor. Think positively. Your thoughts can affect your pain. Do fun things to distract yourself from the pain. See a movie, read a book, listen to music, or spend time with a friend.   Keep a pain diary. Try to write down how strong your pain is and what it feels like. Also try to notice and write down how your moods, thoughts, sleep, activities, and medicine affect your pain. These notes can help you and your doctor find the best ways to treat your pain. Reducing constipation caused by pain medicine  Pain medicines often cause constipation. To reduce constipation:  Include fruits, vegetables, beans, and whole grains in your diet each day. These foods are high in fiber. Drink plenty of fluids, enough so that your urine is light yellow or clear like water. If you have kidney, heart, or liver disease and have to limit fluids, talk with your doctor before you increase the amount of fluids you drink. Get some exercise every day. Build up slowly to 30 to 60 minutes a day on 5 or more days of the week. Take a fiber supplement, such as Citrucel or Metamucil, every day if needed. Read and follow all instructions on the label. Schedule time each day for a bowel movement. Having a daily routine may help. Take your time and do not strain when having a bowel movement. Ask your doctor about a laxative. The goal is to have one easy bowel movement every 1 to 2 days. Do not let constipation go untreated for more than 3 days. When should you call for help? Call your doctor now or seek immediate medical care if:    You feel sad, anxious, or hopeless for more than a few days. This could mean you are depressed. Depression is common in people who have a lot of pain. But it can be treated.     You have trouble with bowel movements, such as:  No bowel movement in 3 days. Blood in the anal area, in your stool, or on the toilet paper. Diarrhea for more than 24 hours. Watch closely for changes in your health, and be sure to contact your doctor if:    Your pain is getting worse.     You can't sleep because of pain.     You are very worried or anxious about your pain.     You have trouble taking your pain medicine.   You have any concerns about your pain medicine or its side effects.     You have vomiting or cramps for more than 2 hours. Where can you learn more? Go to https://Total [employer]ist[employer]ix.TaCerto.com. org and sign in to your [employer] account. Enter E409 in the [employer] box to learn more about \"Neuropathic Pain: Care Instructions. \"     If you do not have an account, please click on the \"Sign Up Now\" link. Current as of: August 4, 2020               Content Version: 12.8  © 9651-2113 Healthwise, Incorporated. Care instructions adapted under license by [facility] ([facility]). If you have questions about a medical condition or this instruction, always ask your healthcare professional. [address] any warranty or liability for your use of this information.

## 2021-05-03 DIAGNOSIS — M05.771 RHEUMATOID ARTHRITIS INVOLVING BOTH ANKLES WITH POSITIVE RHEUMATOID FACTOR (HCC): ICD-10-CM

## 2021-05-03 DIAGNOSIS — M05.772 RHEUMATOID ARTHRITIS INVOLVING BOTH ANKLES WITH POSITIVE RHEUMATOID FACTOR (HCC): ICD-10-CM

## 2021-05-03 RX ORDER — HYDROXYCHLOROQUINE SULFATE 200 MG/1
200 TABLET, FILM COATED ORAL 2 TIMES DAILY
Qty: 180 TABLET | Refills: 1 | OUTPATIENT
Start: 2021-05-03

## 2021-05-03 RX ORDER — SULFASALAZINE 500 MG/1
1000 TABLET ORAL 2 TIMES DAILY
Qty: 120 TABLET | Refills: 0 | OUTPATIENT
Start: 2021-05-03

## 2021-05-03 NOTE — TELEPHONE ENCOUNTER
Cuong Huddleston called requesting a refill on the following medications:  Requested Prescriptions     Pending Prescriptions Disp Refills    hydroxychloroquine (PLAQUENIL) 200 MG tablet 180 tablet 1     Sig: Take 1 tablet by mouth 2 times daily    sulfaSALAzine (AZULFIDINE) 500 MG tablet 120 tablet 0     Sig: Take 2 tablets by mouth 2 times daily     Pharmacy verified:  .pv  [facility] on [address]    Date of last visit:  03/24/2021  Date of next visit (if applicable): 10/56/1881          ** patient stated that there have been problems with his refills in the past, please contact patient if this is not able to be refilled.

## 2021-05-03 NOTE — TELEPHONE ENCOUNTER
Pt called back I reviewed  labs needing done for refill on the sulfasalazine, pt voiced understanding.

## 2021-05-04 ENCOUNTER — HOSPITAL ENCOUNTER (OUTPATIENT)
Age: 71
Discharge: HOME | End: 2021-05-04
Payer: MEDICARE

## 2021-05-04 DIAGNOSIS — D72.819 LEUKOPENIA, UNSPECIFIED TYPE: Primary | ICD-10-CM

## 2021-05-04 LAB
ALBUMIN SERPL-MCNC: 4.3 G/DL (ref 3.5–5.1)
ALP BLD-CCNC: 40 U/L (ref 38–126)
ALT SERPL-CCNC: 12 U/L (ref 11–66)
ANION GAP SERPL CALCULATED.3IONS-SCNC: 8 MEQ/L (ref 8–16)
AST SERPL-CCNC: 18 U/L (ref 5–40)
BASOPHILS # BLD: 0.7 %
BASOPHILS ABSOLUTE: 0 THOU/MM3 (ref 0–0.1)
BILIRUB SERPL-MCNC: 0.6 MG/DL (ref 0.3–1.2)
BILIRUBIN DIRECT: < 0.2 MG/DL (ref 0–0.3)
BUN BLDV-MCNC: 8 MG/DL (ref 7–22)
C-REACTIVE PROTEIN: < 0.3 MG/DL (ref 0–1)
CALCIUM SERPL-MCNC: 9.4 MG/DL (ref 8.5–10.5)
CHLORIDE BLD-SCNC: 96 MEQ/L (ref 98–111)
CHOLESTEROL, TOTAL: 187 MG/DL (ref 100–199)
CO2: 29 MEQ/L (ref 23–33)
CREAT SERPL-MCNC: 0.8 MG/DL (ref 0.4–1.2)
EOSINOPHIL # BLD: 0.3 %
EOSINOPHILS ABSOLUTE: 0 THOU/MM3 (ref 0–0.4)
ERYTHROCYTE [DISTWIDTH] IN BLOOD BY AUTOMATED COUNT: 12.5 % (ref 11.5–14.5)
ERYTHROCYTE [DISTWIDTH] IN BLOOD BY AUTOMATED COUNT: 47.4 FL (ref 35–45)
GFR SERPL CREATININE-BSD FRML MDRD: > 90 ML/MIN/1.73M2
GLUCOSE BLD-MCNC: 83 MG/DL (ref 70–108)
HCT VFR BLD CALC: 45.1 % (ref 42–52)
HDLC SERPL-MCNC: 83 MG/DL
HEMOGLOBIN: 15 GM/DL (ref 14–18)
IMMATURE GRANS (ABS): 0.01 THOU/MM3 (ref 0–0.07)
IMMATURE GRANULOCYTES: 0.3 %
LDL CHOLESTEROL CALCULATED: 97 MG/DL
LYMPHOCYTES # BLD: 38.9 %
LYMPHOCYTES ABSOLUTE: 1.1 THOU/MM3 (ref 1–4.8)
MAGNESIUM: 2 MG/DL (ref 1.6–2.4)
MCH RBC QN AUTO: 33.9 PG (ref 26–33)
MCHC RBC AUTO-ENTMCNC: 33.3 GM/DL (ref 32.2–35.5)
MCV RBC AUTO: 102 FL (ref 80–94)
MONOCYTES # BLD: 13.3 %
MONOCYTES ABSOLUTE: 0.4 THOU/MM3 (ref 0.4–1.3)
NUCLEATED RED BLOOD CELLS: 0 /100 WBC
PLATELET # BLD: 215 THOU/MM3 (ref 130–400)
PMV BLD AUTO: 9.2 FL (ref 9.4–12.4)
POTASSIUM SERPL-SCNC: 4.3 MEQ/L (ref 3.5–5.2)
RBC # BLD: 4.42 MILL/MM3 (ref 4.7–6.1)
SEDIMENTATION RATE, ERYTHROCYTE: 1 MM/HR (ref 0–10)
SEG NEUTROPHILS: 46.5 %
SEGMENTED NEUTROPHILS ABSOLUTE COUNT: 1.3 THOU/MM3 (ref 1.8–7.7)
SODIUM BLD-SCNC: 133 MEQ/L (ref 135–145)
TOTAL PROTEIN: 6.5 G/DL (ref 6.1–8)
TRIGL SERPL-MCNC: 36 MG/DL (ref 0–199)
TSH SERPL DL<=0.05 MIU/L-ACNC: 1.33 UIU/ML (ref 0.4–4.2)
VITAMIN D 25-HYDROXY: 31 NG/ML (ref 30–100)
WBC # BLD: 2.9 THOU/MM3 (ref 4.8–10.8)

## 2021-05-04 PROCEDURE — 85025 COMPLETE CBC W/AUTO DIFF WBC: CPT

## 2021-05-04 PROCEDURE — 82306 VITAMIN D 25 HYDROXY: CPT

## 2021-05-04 PROCEDURE — 83735 ASSAY OF MAGNESIUM: CPT

## 2021-05-04 PROCEDURE — 82248 BILIRUBIN DIRECT: CPT

## 2021-05-04 PROCEDURE — 80061 LIPID PANEL: CPT

## 2021-05-04 PROCEDURE — 85651 RBC SED RATE NONAUTOMATED: CPT

## 2021-05-04 PROCEDURE — 86140 C-REACTIVE PROTEIN: CPT

## 2021-05-04 PROCEDURE — 80053 COMPREHEN METABOLIC PANEL: CPT

## 2021-05-04 PROCEDURE — 36415 COLL VENOUS BLD VENIPUNCTURE: CPT

## 2021-05-04 PROCEDURE — 84443 ASSAY THYROID STIM HORMONE: CPT

## 2021-07-28 ENCOUNTER — OFFICE VISIT (OUTPATIENT)
Dept: RHEUMATOLOGY | Age: 71
End: 2021-07-28
Payer: MEDICARE

## 2021-07-28 VITALS
HEART RATE: 77 BPM | OXYGEN SATURATION: 96 % | DIASTOLIC BLOOD PRESSURE: 84 MMHG | WEIGHT: 176.6 LBS | HEIGHT: 75 IN | SYSTOLIC BLOOD PRESSURE: 136 MMHG | BODY MASS INDEX: 21.96 KG/M2

## 2021-07-28 DIAGNOSIS — M05.772 RHEUMATOID ARTHRITIS INVOLVING BOTH ANKLES WITH POSITIVE RHEUMATOID FACTOR (HCC): ICD-10-CM

## 2021-07-28 DIAGNOSIS — D72.819 LEUKOPENIA, UNSPECIFIED TYPE: ICD-10-CM

## 2021-07-28 DIAGNOSIS — I73.00 RAYNAUD'S PHENOMENON WITHOUT GANGRENE: Primary | ICD-10-CM

## 2021-07-28 DIAGNOSIS — M05.771 RHEUMATOID ARTHRITIS INVOLVING BOTH ANKLES WITH POSITIVE RHEUMATOID FACTOR (HCC): ICD-10-CM

## 2021-07-28 PROCEDURE — 99213 OFFICE O/P EST LOW 20 MIN: CPT | Performed by: INTERNAL MEDICINE

## 2021-07-28 ASSESSMENT — ENCOUNTER SYMPTOMS
EYE PAIN: 0
COUGH: 0
ABDOMINAL PAIN: 0
CONSTIPATION: 0
TROUBLE SWALLOWING: 0
DIARRHEA: 0
NAUSEA: 0
SHORTNESS OF BREATH: 0
EYE ITCHING: 0
BACK PAIN: 0

## 2021-07-28 NOTE — PROGRESS NOTES
[facility] RHEUMATOLOGY FOLLOW UP NOTE       Date Of Service: 7/28/2021  Provider: Kelton Dejesus DO    Name: Brad Lenz   MRN: [id number]    Chief Complaint(s)     Chief Complaint   Patient presents with    Follow-up     4 month f/u        History of Present Illness (HPI)     Brad Lenz  is a(n)70 y.o. male with a hx of h/o vitamin B12 def, rheumatoid arthritis, sensorimotor axonal neuropathy here for the f/u evaluation of rheumatoid arthritis. Off medications fro the past 3 months. - worsening balance. , increased ankles soreness. pain affecting the hands, knees, ankles  Pain described as  stiffness, sore, variable. Timing:mornings  Aggravating factors: ankles feet: wearing shows, prolonged wt bearing,  Hands: increased use, inactivity  Alleviating factors: movement. Feet: taking off shoes  + AM stiffness lasting ~ 5-10minutes      REVIEW OF SYSTEMS: (ROS)    Review of Systems   Constitutional: Positive for fatigue. Negative for fever and unexpected weight change. HENT: Negative for congestion and trouble swallowing. Eyes: Negative for pain and itching. Respiratory: Negative for cough and shortness of breath. Cardiovascular: Negative for chest pain and leg swelling. Gastrointestinal: Negative for abdominal pain, constipation, diarrhea and nausea. Endocrine: Negative for cold intolerance and heat intolerance. Genitourinary: Negative for difficulty urinating, frequency and urgency. Musculoskeletal: Positive for arthralgias and joint swelling. Negative for back pain. Skin: Negative for rash. Neurological: Negative for dizziness, weakness, numbness and headaches. Psychiatric/Behavioral: The patient is not nervous/anxious.         PAST MEDICAL HISTORY      Past Medical History:   Diagnosis Date    Arthritis     Gout     Neuropathy     Vitamin B12 deficiency        PAST SURGICAL HISTORY      Past Surgical History:   Procedure Laterality Date    BACK SURGERY      COLONOSCOPY  EYE SURGERY      FOOT SURGERY      MA COLON CA SCRN NOT  [address] IND Left 11/3/2017    COLONOSCOPY performed by Esteban Vincent MD at [facility]       FAMILY HISTORY      Family History   Problem Relation Age of Onset    Other Mother         Gout    Heart Disease Mother     Lupus Mother     Heart Disease Father     Cancer Father         pancreatic    No Known Problems Sister     High Cholesterol Brother     Arthritis Brother        SOCIAL HISTORY      Social History     Tobacco History     Smoking Status  Never Smoker    Smokeless Tobacco Use  Never Used          Alcohol History     Alcohol Use Status  Yes Drinks/Week  21 Glasses of wine per week Amount  21.0 standard drinks of alcohol/wk          Drug Use     Drug Use Status  No          Sexual Activity     Sexually Active  Yes Partners  Female Comment  , monogamous                ALLERGIES   No Known Allergies    CURRENT MEDICATIONS      Current Outpatient Medications   Medication Sig Dispense Refill    sulfaSALAzine (AZULFIDINE) 500 MG tablet Take 2 tablets by mouth 2 times daily 120 tablet 0    Cyanocobalamin (VITAMIN B-12) 1000 MCG SUBL   0    hydroxychloroquine (PLAQUENIL) 200 MG tablet Take 1 tablet by mouth 2 times daily (Patient not taking: Reported on 7/28/2021) 180 tablet 1    Ascorbic Acid (VITAMIN C) 500 MG CAPS Take 500 mg by mouth daily (Patient not taking: Reported on 7/28/2021)       No current facility-administered medications for this visit. PHYSICAL EXAMINATION / OBJECTIVE   Objective:  /84 (Site: Left Upper Arm, Position: Sitting, Cuff Size: Medium Adult)   Pulse 77   Ht 6' 2.8\" (1.9 m)   Wt 176 lb 9.6 oz (80.1 kg)   SpO2 96%   BMI 22.19 kg/m²     Physical Exam  Vitals reviewed. Constitutional:       Appearance: He is well-developed. Cardiovascular:      Rate and Rhythm: Normal rate and regular rhythm. Pulmonary:      Effort: Pulmonary effort is normal.      Breath sounds: Normal breath sounds. Abdominal:      Palpations: Abdomen is soft. Tenderness: There is no abdominal tenderness. Musculoskeletal:      Cervical back: Normal range of motion and neck supple. Skin:     General: Skin is warm and dry. Findings: No rash. Neurological:      Mental Status: He is alert and oriented to person, place, and time. Deep Tendon Reflexes: Reflexes are normal and symmetric. Psychiatric:         Thought Content: Thought content normal.         Upper extremities:    SHOULDERS nontender, no swelling ,   ELBOWS nontender, no swelling,   WRISTS - tender Right. HANDS/FINGERS    MCPs tender and swelling Right 2,3,4, left 2,3     PIPs tender bilat , hypertorphic changes.      Lower extremities:  HIPS nontender  KNEES nontender, no swelling  ANKLES tender,  FEET : + bilat MTPs, + hammer toes          LABS    CBC  Lab Results   Component Value Date    WBC 2.9 05/04/2021    RBC 4.42 05/04/2021    RBC 4.50 11/18/2020    HGB 15.0 05/04/2021    HCT 45.1 05/04/2021    .0 05/04/2021    MCH 33.9 05/04/2021    MCHC 33.3 05/04/2021    RDW 13.1 11/18/2020     05/04/2021       CMP  Lab Results   Component Value Date    CALCIUM 9.4 05/04/2021    LABALBU 4.3 05/04/2021    PROT 6.5 05/04/2021     05/04/2021    K 4.3 05/04/2021    CO2 29 05/04/2021    CL 96 05/04/2021    BUN 8 05/04/2021    CREATININE 0.8 05/04/2021    ALKPHOS 40 05/04/2021    ALT 12 05/04/2021    AST 18 05/04/2021       HgBA1c: No components found for: HGBA1C    Lab Results   Component Value Date    VITD25 31 05/04/2021         Lab Results   Component Value Date    ANASCRN None Detected 09/13/2017     Lab Results   Component Value Date    SSA SEE BELOW 05/14/2019     Lab Results   Component Value Date    SSB 0 05/14/2019     No results found for: ANTI-SMITH  Lab Results   Component Value Date    DSDNAAB SEE BELOW 05/14/2019      No results found for: ANTIRNP  No results found for: C3, C4  Lab Results   Component Value Date    CCPAB

## 2022-04-07 ENCOUNTER — OFFICE VISIT (OUTPATIENT)
Dept: FAMILY MEDICINE CLINIC | Age: 72
End: 2022-04-07
Payer: MEDICARE

## 2022-04-07 VITALS
TEMPERATURE: 96.8 F | WEIGHT: 189.8 LBS | RESPIRATION RATE: 16 BRPM | BODY MASS INDEX: 24.36 KG/M2 | SYSTOLIC BLOOD PRESSURE: 134 MMHG | HEIGHT: 74 IN | DIASTOLIC BLOOD PRESSURE: 71 MMHG

## 2022-04-07 DIAGNOSIS — M05.772 RHEUMATOID ARTHRITIS INVOLVING BOTH ANKLES WITH POSITIVE RHEUMATOID FACTOR (HCC): ICD-10-CM

## 2022-04-07 DIAGNOSIS — D70.8 OTHER NEUTROPENIA (HCC): ICD-10-CM

## 2022-04-07 DIAGNOSIS — I10 ESSENTIAL (PRIMARY) HYPERTENSION: ICD-10-CM

## 2022-04-07 DIAGNOSIS — E78.2 MIXED HYPERLIPIDEMIA: ICD-10-CM

## 2022-04-07 DIAGNOSIS — M05.771 RHEUMATOID ARTHRITIS INVOLVING BOTH ANKLES WITH POSITIVE RHEUMATOID FACTOR (HCC): ICD-10-CM

## 2022-04-07 DIAGNOSIS — R97.20 ELEVATED PSA: Primary | ICD-10-CM

## 2022-04-07 DIAGNOSIS — K90.9 INTESTINAL MALABSORPTION, UNSPECIFIED TYPE: ICD-10-CM

## 2022-04-07 DIAGNOSIS — Z12.11 COLON CANCER SCREENING: ICD-10-CM

## 2022-04-07 PROCEDURE — 99214 OFFICE O/P EST MOD 30 MIN: CPT | Performed by: FAMILY MEDICINE

## 2022-04-07 RX ORDER — ATORVASTATIN CALCIUM 40 MG/1
40 TABLET, FILM COATED ORAL DAILY
Qty: 30 TABLET | Refills: 3 | Status: SHIPPED | OUTPATIENT
Start: 2022-04-07

## 2022-04-07 SDOH — ECONOMIC STABILITY: FOOD INSECURITY: WITHIN THE PAST 12 MONTHS, YOU WORRIED THAT YOUR FOOD WOULD RUN OUT BEFORE YOU GOT MONEY TO BUY MORE.: NEVER TRUE

## 2022-04-07 SDOH — ECONOMIC STABILITY: FOOD INSECURITY: WITHIN THE PAST 12 MONTHS, THE FOOD YOU BOUGHT JUST DIDN'T LAST AND YOU DIDN'T HAVE MONEY TO GET MORE.: NEVER TRUE

## 2022-04-07 ASSESSMENT — PATIENT HEALTH QUESTIONNAIRE - PHQ9
SUM OF ALL RESPONSES TO PHQ QUESTIONS 1-9: 0
2. FEELING DOWN, DEPRESSED OR HOPELESS: 0
1. LITTLE INTEREST OR PLEASURE IN DOING THINGS: 0
SUM OF ALL RESPONSES TO PHQ9 QUESTIONS 1 & 2: 0
SUM OF ALL RESPONSES TO PHQ QUESTIONS 1-9: 0
2. FEELING DOWN, DEPRESSED OR HOPELESS: 0
1. LITTLE INTEREST OR PLEASURE IN DOING THINGS: 0
SUM OF ALL RESPONSES TO PHQ QUESTIONS 1-9: 0
SUM OF ALL RESPONSES TO PHQ9 QUESTIONS 1 & 2: 0
SUM OF ALL RESPONSES TO PHQ QUESTIONS 1-9: 0
SUM OF ALL RESPONSES TO PHQ QUESTIONS 1-9: 0

## 2022-04-07 ASSESSMENT — ENCOUNTER SYMPTOMS
ABDOMINAL PAIN: 0
DIARRHEA: 0
CONSTIPATION: 0
EYE PAIN: 0
SHORTNESS OF BREATH: 0
COUGH: 0

## 2022-04-07 ASSESSMENT — SOCIAL DETERMINANTS OF HEALTH (SDOH): HOW HARD IS IT FOR YOU TO PAY FOR THE VERY BASICS LIKE FOOD, HOUSING, MEDICAL CARE, AND HEATING?: NOT HARD AT ALL

## 2022-04-07 NOTE — PROGRESS NOTES
S: 73 y.o. male with   Chief Complaint   Patient presents with    Follow-up     Patient is a 71yo male who presents for follow up for chronic conditions. Due for routine blood work with PSA in 3 months. ASCVD elevated. Hx of RA previously followed with Dr. Shin Mary, not currently taking any medications for this, follow up with Rheum PRN. The 10-year ASCVD risk score (Samantha Lowe., et al., 2013) is: 13.9%    Values used to calculate the score:      Age: 73 years      Sex: Male      Is Non- : No      Diabetic: No      Tobacco smoker: No      Systolic Blood Pressure: 075 mmHg      Is BP treated: No      HDL Cholesterol: 83 mg/dL      Total Cholesterol: 187 mg/dL      BP Readings from Last 3 Encounters:   04/07/22 134/71   07/28/21 136/84   04/06/21 134/80     Wt Readings from Last 3 Encounters:   04/07/22 189 lb 12.8 oz (86.1 kg)   07/28/21 176 lb 9.6 oz (80.1 kg)   04/06/21 183 lb (83 kg)           O: VS:  height is 6' 2\" (1.88 m) and weight is 189 lb 12.8 oz (86.1 kg). His temperature is 96.8 °F (36 °C). His blood pressure is 134/71. His respiration is 16. Diagnosis Orders   1. Elevated PSA     2. Other neutropenia ([address]     3. Rheumatoid arthritis involving both ankles with positive rheumatoid factor (HCC)     4. Intestinal malabsorption, unspecified type     5. Colon cancer screening     6. Mixed hyperlipidemia         Plan  Start Statin. Atorvastatin 40mg daily. Check routine blood work. Check PSA. Consider urology referral if continuing to increase. Follow up with Rheum PRN. Health Maintenance Due   Topic Date Due    Annual Wellness Visit (AWV)  06/10/2020    COVID-19 Vaccine (3 - Moderna risk 4-dose series) 05/07/2021    Depression Screen  04/06/2022         Attending Physician Statement  I have discussed the case, including pertinent history and exam findings with the resident. I agree with the documented assessment and plan as documented by the resident. [address] [facility],  4/7/2022 10:39 AM

## 2022-04-07 NOTE — PROGRESS NOTES
S: 73 y.o. 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    Here for the yearly visit - not on the lipitor and is willing and will start that    Was on the meds for the RA and stopped them all - they did not help and he did not want the bloodwork every 2 months    Sees Salomon    BP Readings from Last 3 Encounters:   04/07/22 134/71   07/28/21 136/84   04/06/21 134/80     Wt Readings from Last 3 Encounters:   04/07/22 189 lb 12.8 oz (86.1 kg)   07/28/21 176 lb 9.6 oz (80.1 kg)   04/06/21 183 lb (83 kg)           O: VS:   Vitals:    04/07/22 1019   BP: 134/71   Site: Right Upper Arm   Position: Sitting   Cuff Size: Medium Adult   Resp: 16   Temp: 96.8 °F (36 °C)   Weight: 189 lb 12.8 oz (86.1 kg)   Height: 6' 2\" (1.88 m)     Body mass index is 24.37 kg/m². AAO/NAD, appropriate affect for mood  Normocephalic, atraumatic, eyes - conjunctiva and sclera normal,   skin no rashes on exposed areas   Insight, judgement normal and in no acute distress      Lab Results   Component Value Date    WBC 2.9 (L) 05/04/2021    HGB 15.0 05/04/2021    HCT 45.1 05/04/2021     05/04/2021    CHOL 187 05/04/2021    TRIG 36 05/04/2021    HDL 83 05/04/2021    LDLCALC 97 05/04/2021    AST 18 05/04/2021     (L) 05/04/2021    K 4.3 05/04/2021    CL 96 (L) 05/04/2021    CREATININE 0.8 05/04/2021    BUN 8 05/04/2021    CO2 29 05/04/2021    TSH 1.330 05/04/2021    PSA 2.30 (H) 07/26/2019    LABA1C 5.4 04/13/2019    LABGLOM >90 05/04/2021    MG 2.0 05/04/2021    CALCIUM 9.4 05/04/2021    VITD25 31 05/04/2021       No results found. Diagnosis Orders   1. Elevated PSA  PSA Prostatic Specific Antigen   2. Other neutropenia (HCC)  CBC with Auto Differential   3. Rheumatoid arthritis involving both ankles with positive rheumatoid factor (HCC)  Comprehensive Metabolic Panel   4.  Intestinal malabsorption, unspecified type  Lipid, Fasting    Vitamin D 25 Hydroxy    Magnesium   5. Colon cancer screening     6. Mixed hyperlipidemia  atorvastatin (LIPITOR) 40 MG tablet   7. Essential (primary) hypertension   Lipid, Fasting       Plan  Will do the psa and lipids after on the lipitor    Will start the lipitor    Will see you in a year    Will do the colonoscopy in 2027           No follow-ups on file. Orders Placed:  Orders Placed This Encounter   Procedures    Comprehensive Metabolic Panel    CBC with Auto Differential    PSA Prostatic Specific Antigen    Lipid, Fasting    Vitamin D 25 Hydroxy    Magnesium     Medications Prescribed:  Orders Placed This Encounter   Medications    atorvastatin (LIPITOR) 40 MG tablet     Sig: Take 1 tablet by mouth daily     Dispense:  30 tablet     Refill:  3       No future appointments. Health Maintenance Due   Topic Date Due    Annual Wellness Visit (AWV)  06/10/2020    COVID-19 Vaccine (3 - Moderna risk 4-dose series) 05/07/2021    Depression Screen  04/06/2022         Attending Physician Statement  I have discussed the case, including pertinent history and exam findings with the resident. I also have seen the patient and performed key portions of the examination. I agree with the documented assessment and plan as documented by the resident.   GE modifier added to this encounter      Juan Diaz DO 4/7/2022 3:49 PM

## 2022-04-07 NOTE — PROGRESS NOTES
Rosana Hodgkins (:  1950) is a 73 y.o. male,Established patient, here for evaluation of the following chief complaint(s):  No chief complaint on file. ASSESSMENT/PLAN:  1. Elevated PSA  2. Other neutropenia ([address]  3. Rheumatoid arthritis involving both ankles with positive rheumatoid factor (HCC)  4. Intestinal malabsorption, unspecified type  5. Colon cancer screening    The 10-year ASCVD risk score (Yue Gr, et al., 2013) is: 13.9%    Values used to calculate the score:      Age: 73 years      Sex: Male      Is Non- : No      Diabetic: No      Tobacco smoker: No      Systolic Blood Pressure: 631 mmHg      Is BP treated: No      HDL Cholesterol: 83 mg/dL      Total Cholesterol: 187 mg/dL    Began statin for elevated ASCVD    To discuss RA with rheumatology. To obtain colon cancer screen in /    To obtain routine labs as above. No follow-ups on file. Subjective   SUBJECTIVE/OBJECTIVE:  HPI     Neuropathy/RA  Is seeing rheumatology for this. Not taking medications. Discussed biologics, patient will think about this. HLD  Not taking any medication for this. Noted on last labs. Health Maintenance  History of elevated PSA, no BPH symptoms. Had normal colnoscopy in 2017. No significant family history of colon cancer        Review of Systems   Constitutional: Negative for chills, fatigue and fever. HENT: Negative for congestion and ear pain. Eyes: Negative for pain and visual disturbance. Respiratory: Negative for cough and shortness of breath. Cardiovascular: Negative for chest pain and leg swelling. Gastrointestinal: Negative for abdominal pain, constipation and diarrhea. Genitourinary: Negative for difficulty urinating, dysuria and hematuria. Musculoskeletal: Negative for arthralgias and myalgias. Allergic/Immunologic: Negative for environmental allergies. Neurological: Negative for dizziness and headaches. Psychiatric/Behavioral: Negative for behavioral problems and sleep disturbance. Objective   Physical Exam  Vitals and nursing note reviewed. Constitutional:       Appearance: Normal appearance. HENT:      Head: Normocephalic and atraumatic. Nose: Nose normal.      Mouth/Throat:      Mouth: Mucous membranes are moist.      Pharynx: Oropharynx is clear. Eyes:      Extraocular Movements: Extraocular movements intact. Pupils: Pupils are equal, round, and reactive to light. Cardiovascular:      Rate and Rhythm: Normal rate and regular rhythm. Pulses: Normal pulses. Heart sounds: Normal heart sounds. Pulmonary:      Effort: Pulmonary effort is normal.      Breath sounds: Normal breath sounds. Abdominal:      General: Abdomen is flat. Bowel sounds are normal.   Musculoskeletal:         General: No signs of injury. Normal range of motion. Cervical back: Normal range of motion and neck supple. Skin:     General: Skin is warm and dry. Capillary Refill: Capillary refill takes less than 2 seconds. Neurological:      General: No focal deficit present. Mental Status: He is alert and oriented to person, place, and time. Mental status is at baseline. Psychiatric:         Mood and Affect: Mood normal.         Behavior: Behavior normal.                  An electronic signature was used to authenticate this note.     --Merritt Dixon MD

## 2022-07-12 ENCOUNTER — NURSE ONLY (OUTPATIENT)
Dept: LAB | Age: 72
End: 2022-07-12

## 2022-07-12 LAB
ALBUMIN SERPL-MCNC: 4.9 G/DL (ref 3.5–5.1)
ALP BLD-CCNC: 59 U/L (ref 38–126)
ALT SERPL-CCNC: 19 U/L (ref 11–66)
ANION GAP SERPL CALCULATED.3IONS-SCNC: 12 MEQ/L (ref 8–16)
AST SERPL-CCNC: 26 U/L (ref 5–40)
BASOPHILS # BLD: 1.1 %
BASOPHILS ABSOLUTE: 0 THOU/MM3 (ref 0–0.1)
BILIRUB SERPL-MCNC: 0.7 MG/DL (ref 0.3–1.2)
BUN BLDV-MCNC: 13 MG/DL (ref 7–22)
CALCIUM SERPL-MCNC: 9.3 MG/DL (ref 8.5–10.5)
CHLORIDE BLD-SCNC: 100 MEQ/L (ref 98–111)
CHOLESTEROL, TOTAL: 149 MG/DL (ref 100–199)
CO2: 25 MEQ/L (ref 23–33)
CREAT SERPL-MCNC: 0.7 MG/DL (ref 0.4–1.2)
EOSINOPHIL # BLD: 0.3 %
EOSINOPHILS ABSOLUTE: 0 THOU/MM3 (ref 0–0.4)
ERYTHROCYTE [DISTWIDTH] IN BLOOD BY AUTOMATED COUNT: 12.6 % (ref 11.5–14.5)
ERYTHROCYTE [DISTWIDTH] IN BLOOD BY AUTOMATED COUNT: 46.5 FL (ref 35–45)
GFR SERPL CREATININE-BSD FRML MDRD: > 90 ML/MIN/1.73M2
GLUCOSE BLD-MCNC: 96 MG/DL (ref 70–108)
HCT VFR BLD CALC: 43.9 % (ref 42–52)
HDLC SERPL-MCNC: 86 MG/DL
HEMOGLOBIN: 14.6 GM/DL (ref 14–18)
IMMATURE GRANS (ABS): 0 THOU/MM3 (ref 0–0.07)
IMMATURE GRANULOCYTES: 0 %
LDL CHOLESTEROL CALCULATED: 56 MG/DL
LYMPHOCYTES # BLD: 31.8 %
LYMPHOCYTES ABSOLUTE: 1.1 THOU/MM3 (ref 1–4.8)
MAGNESIUM: 2 MG/DL (ref 1.6–2.4)
MCH RBC QN AUTO: 33.3 PG (ref 26–33)
MCHC RBC AUTO-ENTMCNC: 33.3 GM/DL (ref 32.2–35.5)
MCV RBC AUTO: 100 FL (ref 80–94)
MONOCYTES # BLD: 8.9 %
MONOCYTES ABSOLUTE: 0.3 THOU/MM3 (ref 0.4–1.3)
NUCLEATED RED BLOOD CELLS: 0 /100 WBC
PLATELET # BLD: 227 THOU/MM3 (ref 130–400)
PMV BLD AUTO: 9.6 FL (ref 9.4–12.4)
POTASSIUM SERPL-SCNC: 4.6 MEQ/L (ref 3.5–5.2)
PROSTATE SPECIFIC ANTIGEN: 1.57 NG/ML (ref 0–1)
RBC # BLD: 4.39 MILL/MM3 (ref 4.7–6.1)
SEG NEUTROPHILS: 57.9 %
SEGMENTED NEUTROPHILS ABSOLUTE COUNT: 2 THOU/MM3 (ref 1.8–7.7)
SODIUM BLD-SCNC: 137 MEQ/L (ref 135–145)
TOTAL PROTEIN: 7.3 G/DL (ref 6.1–8)
TRIGL SERPL-MCNC: 36 MG/DL (ref 0–199)
VITAMIN D 25-HYDROXY: 46 NG/ML (ref 30–100)
WBC # BLD: 3.5 THOU/MM3 (ref 4.8–10.8)

## 2022-07-21 ENCOUNTER — TELEPHONE (OUTPATIENT)
Dept: FAMILY MEDICINE CLINIC | Age: 72
End: 2022-07-21

## 2023-05-22 ENCOUNTER — NURSE ONLY (OUTPATIENT)
Dept: LAB | Age: 73
End: 2023-05-22

## 2023-05-22 ENCOUNTER — OFFICE VISIT (OUTPATIENT)
Dept: FAMILY MEDICINE CLINIC | Age: 73
End: 2023-05-22
Payer: MEDICARE

## 2023-05-22 VITALS
OXYGEN SATURATION: 98 % | TEMPERATURE: 97.5 F | RESPIRATION RATE: 20 BRPM | SYSTOLIC BLOOD PRESSURE: 162 MMHG | WEIGHT: 178.6 LBS | DIASTOLIC BLOOD PRESSURE: 110 MMHG | BODY MASS INDEX: 22.92 KG/M2 | HEART RATE: 75 BPM | HEIGHT: 74 IN

## 2023-05-22 DIAGNOSIS — G62.9 NEUROPATHY: ICD-10-CM

## 2023-05-22 DIAGNOSIS — Z78.9 ALCOHOL USE: ICD-10-CM

## 2023-05-22 DIAGNOSIS — Z00.00 ENCOUNTER FOR WELL ADULT EXAM WITHOUT ABNORMAL FINDINGS: ICD-10-CM

## 2023-05-22 DIAGNOSIS — R97.20 ELEVATED PSA: ICD-10-CM

## 2023-05-22 DIAGNOSIS — E78.00 HYPERCHOLESTEREMIA: Primary | ICD-10-CM

## 2023-05-22 LAB
ALBUMIN SERPL BCG-MCNC: 4.7 G/DL (ref 3.5–5.1)
ALP SERPL-CCNC: 49 U/L (ref 38–126)
ALT SERPL W/O P-5'-P-CCNC: 12 U/L (ref 11–66)
ANION GAP SERPL CALC-SCNC: 14 MEQ/L (ref 8–16)
AST SERPL-CCNC: 19 U/L (ref 5–40)
BASOPHILS ABSOLUTE: 0 THOU/MM3 (ref 0–0.1)
BASOPHILS NFR BLD AUTO: 0.8 %
BILIRUB SERPL-MCNC: 0.8 MG/DL (ref 0.3–1.2)
BUN SERPL-MCNC: 11 MG/DL (ref 7–22)
CALCIUM SERPL-MCNC: 9.7 MG/DL (ref 8.5–10.5)
CHLORIDE SERPL-SCNC: 100 MEQ/L (ref 98–111)
CO2 SERPL-SCNC: 26 MEQ/L (ref 23–33)
CREAT SERPL-MCNC: 0.6 MG/DL (ref 0.4–1.2)
DEPRECATED RDW RBC AUTO: 48.5 FL (ref 35–45)
EOSINOPHIL NFR BLD AUTO: 0.3 %
EOSINOPHILS ABSOLUTE: 0 THOU/MM3 (ref 0–0.4)
ERYTHROCYTE [DISTWIDTH] IN BLOOD BY AUTOMATED COUNT: 12.8 % (ref 11.5–14.5)
GFR SERPL CREATININE-BSD FRML MDRD: > 60 ML/MIN/1.73M2
GLUCOSE SERPL-MCNC: 94 MG/DL (ref 70–108)
HCT VFR BLD AUTO: 48.6 % (ref 42–52)
HGB BLD-MCNC: 16.1 GM/DL (ref 14–18)
IMM GRANULOCYTES # BLD AUTO: 0.01 THOU/MM3 (ref 0–0.07)
IMM GRANULOCYTES NFR BLD AUTO: 0.3 %
LYMPHOCYTES ABSOLUTE: 1.1 THOU/MM3 (ref 1–4.8)
LYMPHOCYTES NFR BLD AUTO: 27.3 %
MCH RBC QN AUTO: 33.8 PG (ref 26–33)
MCHC RBC AUTO-ENTMCNC: 33.1 GM/DL (ref 32.2–35.5)
MCV RBC AUTO: 101.9 FL (ref 80–94)
MONOCYTES ABSOLUTE: 0.3 THOU/MM3 (ref 0.4–1.3)
MONOCYTES NFR BLD AUTO: 8.6 %
NEUTROPHILS NFR BLD AUTO: 62.7 %
NRBC BLD AUTO-RTO: 0 /100 WBC
PLATELET # BLD AUTO: 238 THOU/MM3 (ref 130–400)
PMV BLD AUTO: 9.5 FL (ref 9.4–12.4)
POTASSIUM SERPL-SCNC: 4.5 MEQ/L (ref 3.5–5.2)
PROT SERPL-MCNC: 7.2 G/DL (ref 6.1–8)
RBC # BLD AUTO: 4.77 MILL/MM3 (ref 4.7–6.1)
SEGMENTED NEUTROPHILS ABSOLUTE COUNT: 2.5 THOU/MM3 (ref 1.8–7.7)
SODIUM SERPL-SCNC: 140 MEQ/L (ref 135–145)
T4 FREE SERPL-MCNC: 1.35 NG/DL (ref 0.93–1.76)
TSH SERPL DL<=0.005 MIU/L-ACNC: 1.04 UIU/ML (ref 0.4–4.2)
WBC # BLD AUTO: 4 THOU/MM3 (ref 4.8–10.8)

## 2023-05-22 PROCEDURE — 1036F TOBACCO NON-USER: CPT | Performed by: STUDENT IN AN ORGANIZED HEALTH CARE EDUCATION/TRAINING PROGRAM

## 2023-05-22 PROCEDURE — 1123F ACP DISCUSS/DSCN MKR DOCD: CPT | Performed by: STUDENT IN AN ORGANIZED HEALTH CARE EDUCATION/TRAINING PROGRAM

## 2023-05-22 PROCEDURE — 99213 OFFICE O/P EST LOW 20 MIN: CPT | Performed by: STUDENT IN AN ORGANIZED HEALTH CARE EDUCATION/TRAINING PROGRAM

## 2023-05-22 PROCEDURE — G8420 CALC BMI NORM PARAMETERS: HCPCS | Performed by: STUDENT IN AN ORGANIZED HEALTH CARE EDUCATION/TRAINING PROGRAM

## 2023-05-22 PROCEDURE — 3017F COLORECTAL CA SCREEN DOC REV: CPT | Performed by: STUDENT IN AN ORGANIZED HEALTH CARE EDUCATION/TRAINING PROGRAM

## 2023-05-22 PROCEDURE — G8427 DOCREV CUR MEDS BY ELIG CLIN: HCPCS | Performed by: STUDENT IN AN ORGANIZED HEALTH CARE EDUCATION/TRAINING PROGRAM

## 2023-05-22 RX ORDER — MULTIVIT WITH MINERALS/LUTEIN
250 TABLET ORAL DAILY
COMMUNITY

## 2023-05-22 RX ORDER — BIOTIN 2500 MCG
2500 CAPSULE ORAL DAILY
COMMUNITY

## 2023-05-22 SDOH — ECONOMIC STABILITY: FOOD INSECURITY: WITHIN THE PAST 12 MONTHS, THE FOOD YOU BOUGHT JUST DIDN'T LAST AND YOU DIDN'T HAVE MONEY TO GET MORE.: NEVER TRUE

## 2023-05-22 SDOH — ECONOMIC STABILITY: FOOD INSECURITY: WITHIN THE PAST 12 MONTHS, YOU WORRIED THAT YOUR FOOD WOULD RUN OUT BEFORE YOU GOT MONEY TO BUY MORE.: NEVER TRUE

## 2023-05-22 SDOH — ECONOMIC STABILITY: HOUSING INSECURITY
IN THE LAST 12 MONTHS, WAS THERE A TIME WHEN YOU DID NOT HAVE A STEADY PLACE TO SLEEP OR SLEPT IN A SHELTER (INCLUDING NOW)?: NO

## 2023-05-22 SDOH — ECONOMIC STABILITY: INCOME INSECURITY: HOW HARD IS IT FOR YOU TO PAY FOR THE VERY BASICS LIKE FOOD, HOUSING, MEDICAL CARE, AND HEATING?: NOT HARD AT ALL

## 2023-05-22 ASSESSMENT — PATIENT HEALTH QUESTIONNAIRE - PHQ9
SUM OF ALL RESPONSES TO PHQ QUESTIONS 1-9: 0
SUM OF ALL RESPONSES TO PHQ QUESTIONS 1-9: 0
1. LITTLE INTEREST OR PLEASURE IN DOING THINGS: 0
SUM OF ALL RESPONSES TO PHQ QUESTIONS 1-9: 0
2. FEELING DOWN, DEPRESSED OR HOPELESS: 0
SUM OF ALL RESPONSES TO PHQ QUESTIONS 1-9: 0
SUM OF ALL RESPONSES TO PHQ9 QUESTIONS 1 & 2: 0

## 2023-05-22 NOTE — PATIENT INSTRUCTIONS
For the peripheral neuropathy - think about cutting back on alcohol  For medication treatment if you'd choose - gabapentin and lyrica

## 2023-05-22 NOTE — PROGRESS NOTES
[id number] [facility] [address] [id number]  Dept: [phone]  Dept Fax: [phone]: [phone]      Assessment & Plan     1. Encounter for well adult exam without abnormal findings    2. Hypercholesteremia    3. Elevated PSA    4. Neuropathy    5. Alcohol use       Orders Placed This Encounter    Comprehensive Metabolic Panel     Standing Status:   Future     Number of Occurrences:   1     Standing Expiration Date:   5/22/2024    TSH     Standing Status:   Future     Number of Occurrences:   1     Standing Expiration Date:   5/21/2024    T4, Free     Standing Status:   Future     Number of Occurrences:   1     Standing Expiration Date:   5/22/2024    CBC with Auto Differential     Standing Status:   Future     Number of Occurrences:   1     Standing Expiration Date:   5/22/2024    Lipid Panel     Standing Status:   Future     Standing Expiration Date:   5/22/2024     Order Specific Question:   Is Patient Fasting?/# of Hours     Answer:   8    PSA Prostatic Specific Antigen     Standing Status:   Future     Standing Expiration Date:   5/22/2024    Biotin 2500 MCG CAPS     Sig: Take 2,500 mcg by mouth daily    Cyanocobalamin (VITAMIN B 12 PO)     Sig: Take by mouth daily    Ascorbic Acid (VITAMIN C) 250 MG tablet     Sig: Take 1 tablet by mouth daily     Patient to continue lifestyle modifications for blood pressure. Advised patient that starting medication for blood pressure is available should patient desire. Will obtain basic labs. 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 Advised to reduce alcohol use, patient is precontemplative. B12 folate within normal limits. Patient plans on possibly changing providers in future so that he may have more continuity of care with the same provider.   Patient is aware that he can continue making
Health Maintenance Due   Topic Date Due    Shingles vaccine (1 of 2) 06/12/2015    Annual Wellness Visit (AWV)  06/10/2020    COVID-19 Vaccine (4 - Booster) 12/21/2022    Depression Screen  04/07/2023
Annual Wellness Visit (AWV)  06/10/2020    COVID-19 Vaccine (4 - Booster) 12/21/2022       Attending Physician Statement  I have discussed the case, including pertinent history and exam findings with the resident. I agree with the documented assessment and plan as documented by the resident.   [address] [facility],  5/24/2023 6:58 AM

## 2023-05-23 ASSESSMENT — ENCOUNTER SYMPTOMS
GASTROINTESTINAL NEGATIVE: 1
RESPIRATORY NEGATIVE: 1
EYES NEGATIVE: 1

## 2024-08-13 ENCOUNTER — OFFICE VISIT (OUTPATIENT)
Dept: FAMILY MEDICINE CLINIC | Age: 74
End: 2024-08-13
Payer: MEDICARE

## 2024-08-13 VITALS
RESPIRATION RATE: 16 BRPM | SYSTOLIC BLOOD PRESSURE: 136 MMHG | WEIGHT: 179.2 LBS | DIASTOLIC BLOOD PRESSURE: 84 MMHG | HEART RATE: 80 BPM | BODY MASS INDEX: 23 KG/M2 | HEIGHT: 74 IN

## 2024-08-13 DIAGNOSIS — R97.20 ELEVATED PSA: ICD-10-CM

## 2024-08-13 DIAGNOSIS — E55.9 VITAMIN D DEFICIENCY: ICD-10-CM

## 2024-08-13 DIAGNOSIS — R53.83 OTHER FATIGUE: ICD-10-CM

## 2024-08-13 DIAGNOSIS — Z23 NEED FOR SHINGLES VACCINE: Primary | ICD-10-CM

## 2024-08-13 DIAGNOSIS — R79.9 ABNORMAL FINDING OF BLOOD CHEMISTRY, UNSPECIFIED: ICD-10-CM

## 2024-08-13 DIAGNOSIS — R35.1 NOCTURIA: ICD-10-CM

## 2024-08-13 DIAGNOSIS — J40 BRONCHITIS: ICD-10-CM

## 2024-08-13 PROCEDURE — 99214 OFFICE O/P EST MOD 30 MIN: CPT | Performed by: NURSE PRACTITIONER

## 2024-08-13 PROCEDURE — 1123F ACP DISCUSS/DSCN MKR DOCD: CPT | Performed by: NURSE PRACTITIONER

## 2024-08-13 RX ORDER — ZOSTER VACCINE RECOMBINANT, ADJUVANTED 50 MCG/0.5
0.5 KIT INTRAMUSCULAR SEE ADMIN INSTRUCTIONS
Qty: 0.5 ML | Refills: 0 | Status: SHIPPED | OUTPATIENT
Start: 2024-08-13 | End: 2025-02-09

## 2024-08-13 RX ORDER — AMOXICILLIN AND CLAVULANATE POTASSIUM 875; 125 MG/1; MG/1
1 TABLET, FILM COATED ORAL 2 TIMES DAILY
Qty: 20 TABLET | Refills: 0 | Status: SHIPPED | OUTPATIENT
Start: 2024-08-13 | End: 2024-08-23

## 2024-08-13 RX ORDER — METHYLPREDNISOLONE 4 MG/1
TABLET ORAL
Qty: 1 KIT | Refills: 0 | Status: SHIPPED | OUTPATIENT
Start: 2024-08-13 | End: 2024-08-19

## 2024-08-13 SDOH — ECONOMIC STABILITY: FOOD INSECURITY: WITHIN THE PAST 12 MONTHS, YOU WORRIED THAT YOUR FOOD WOULD RUN OUT BEFORE YOU GOT MONEY TO BUY MORE.: NEVER TRUE

## 2024-08-13 SDOH — ECONOMIC STABILITY: FOOD INSECURITY: WITHIN THE PAST 12 MONTHS, THE FOOD YOU BOUGHT JUST DIDN'T LAST AND YOU DIDN'T HAVE MONEY TO GET MORE.: NEVER TRUE

## 2024-08-13 SDOH — ECONOMIC STABILITY: INCOME INSECURITY: HOW HARD IS IT FOR YOU TO PAY FOR THE VERY BASICS LIKE FOOD, HOUSING, MEDICAL CARE, AND HEATING?: NOT HARD AT ALL

## 2024-08-13 ASSESSMENT — ENCOUNTER SYMPTOMS
DIARRHEA: 0
COUGH: 0
SHORTNESS OF BREATH: 0
NAUSEA: 0
SORE THROAT: 0
RHINORRHEA: 1
SINUS PRESSURE: 1
ABDOMINAL PAIN: 0
EYE REDNESS: 0
SINUS PAIN: 1
EYE DISCHARGE: 0
ABDOMINAL DISTENTION: 0
COLOR CHANGE: 0
BLOOD IN STOOL: 0
CONSTIPATION: 0
ANAL BLEEDING: 0

## 2024-08-13 ASSESSMENT — PATIENT HEALTH QUESTIONNAIRE - PHQ9
SUM OF ALL RESPONSES TO PHQ QUESTIONS 1-9: 0
1. LITTLE INTEREST OR PLEASURE IN DOING THINGS: NOT AT ALL
2. FEELING DOWN, DEPRESSED OR HOPELESS: NOT AT ALL
SUM OF ALL RESPONSES TO PHQ9 QUESTIONS 1 & 2: 0
SUM OF ALL RESPONSES TO PHQ QUESTIONS 1-9: 0

## 2024-08-13 NOTE — PROGRESS NOTES
11/3/2017    COLONOSCOPY performed by Rogerio Gillis MD at [facility]     Family History   Problem Relation Age of Onset    Other Mother         Gout    Heart Disease Mother     Lupus Mother     Heart Disease Father     Cancer Father         pancreatic    No Known Problems Sister     High Cholesterol Brother     Arthritis Brother      Social History     Tobacco Use    Smoking status: Never    Smokeless tobacco: Never   Substance Use Topics    Alcohol use: Yes     Alcohol/week: 21.0 standard drinks of alcohol     Types: 21 Glasses of wine per week      Current Outpatient Medications   Medication Sig Dispense Refill    APPLE CIDER VINEGAR PO Take by mouth      zoster recombinant adjuvanted vaccine (SHINGRIX) 50 MCG/0.5ML SUSR injection Inject 0.5 mLs into the muscle See Admin Instructions 1 dose now and repeat in 2-6 months 0.5 mL 0    methylPREDNISolone (MEDROL DOSEPACK) 4 MG tablet Take by mouth. 1 kit 0    amoxicillin-clavulanate (AUGMENTIN) 875-125 MG per tablet Take 1 tablet by mouth 2 times daily for 10 days 20 tablet 0    Cyanocobalamin (VITAMIN B 12 PO) Take by mouth daily      Ascorbic Acid (VITAMIN C) 500 MG CAPS Take 500 mg by mouth daily        No current facility-administered medications for this visit.     No Known Allergies    Subjective:    Review of Systems   Constitutional:  Negative for chills, fatigue and fever.   HENT:  Positive for congestion, postnasal drip, rhinorrhea, sinus pressure and sinus pain. Negative for ear pain and sore throat.    Eyes:  Negative for discharge and redness.   Respiratory:  Negative for cough and shortness of breath.    Cardiovascular:  Negative for chest pain and leg swelling.   Gastrointestinal:  Negative for abdominal distention, abdominal pain, anal bleeding, blood in stool, constipation, diarrhea and nausea.   Skin:  Negative for color change and rash.   Neurological:  Negative for facial asymmetry, speech difficulty and weakness.   Hematological:  Does not

## 2024-09-09 ENCOUNTER — LAB (OUTPATIENT)
Dept: LAB | Age: 74
End: 2024-09-09

## 2024-09-09 DIAGNOSIS — R53.83 OTHER FATIGUE: ICD-10-CM

## 2024-09-09 DIAGNOSIS — R97.20 ELEVATED PSA: ICD-10-CM

## 2024-09-09 DIAGNOSIS — E55.9 VITAMIN D DEFICIENCY: ICD-10-CM

## 2024-09-09 DIAGNOSIS — R35.1 NOCTURIA: ICD-10-CM

## 2024-09-09 DIAGNOSIS — R79.9 ABNORMAL FINDING OF BLOOD CHEMISTRY, UNSPECIFIED: ICD-10-CM

## 2024-09-09 LAB
25(OH)D3 SERPL-MCNC: 45 NG/ML (ref 30–100)
ALBUMIN SERPL BCG-MCNC: 4.4 G/DL (ref 3.5–5.1)
ALP SERPL-CCNC: 52 U/L (ref 38–126)
ALT SERPL W/O P-5'-P-CCNC: 9 U/L (ref 11–66)
ANION GAP SERPL CALC-SCNC: 7 MEQ/L (ref 8–16)
AST SERPL-CCNC: 17 U/L (ref 5–40)
BASOPHILS ABSOLUTE: 0 THOU/MM3 (ref 0–0.1)
BASOPHILS NFR BLD AUTO: 1.2 %
BILIRUB SERPL-MCNC: 0.9 MG/DL (ref 0.3–1.2)
BUN SERPL-MCNC: 9 MG/DL (ref 7–22)
CALCIUM SERPL-MCNC: 9.1 MG/DL (ref 8.5–10.5)
CHLORIDE SERPL-SCNC: 98 MEQ/L (ref 98–111)
CO2 SERPL-SCNC: 29 MEQ/L (ref 23–33)
CREAT SERPL-MCNC: 0.7 MG/DL (ref 0.4–1.2)
DEPRECATED RDW RBC AUTO: 48.5 FL (ref 35–45)
EOSINOPHIL NFR BLD AUTO: 0.3 %
EOSINOPHILS ABSOLUTE: 0 THOU/MM3 (ref 0–0.4)
ERYTHROCYTE [DISTWIDTH] IN BLOOD BY AUTOMATED COUNT: 13 % (ref 11.5–14.5)
FOLATE SERPL-MCNC: 8.3 NG/ML (ref 4.8–24.2)
GFR SERPL CREATININE-BSD FRML MDRD: > 90 ML/MIN/1.73M2
GLUCOSE SERPL-MCNC: 91 MG/DL (ref 70–108)
HCT VFR BLD AUTO: 45.8 % (ref 42–52)
HGB BLD-MCNC: 15.5 GM/DL (ref 14–18)
IMM GRANULOCYTES # BLD AUTO: 0 THOU/MM3 (ref 0–0.07)
IMM GRANULOCYTES NFR BLD AUTO: 0 %
IRON SERPL-MCNC: 143 UG/DL (ref 65–195)
LYMPHOCYTES ABSOLUTE: 1.1 THOU/MM3 (ref 1–4.8)
LYMPHOCYTES NFR BLD AUTO: 32.6 %
MAGNESIUM SERPL-MCNC: 2 MG/DL (ref 1.6–2.4)
MCH RBC QN AUTO: 34.1 PG (ref 26–33)
MCHC RBC AUTO-ENTMCNC: 33.8 GM/DL (ref 32.2–35.5)
MCV RBC AUTO: 100.7 FL (ref 80–94)
MONOCYTES ABSOLUTE: 0.4 THOU/MM3 (ref 0.4–1.3)
MONOCYTES NFR BLD AUTO: 10.7 %
NEUTROPHILS ABSOLUTE: 1.8 THOU/MM3 (ref 1.8–7.7)
NEUTROPHILS NFR BLD AUTO: 55.2 %
NRBC BLD AUTO-RTO: 0 /100 WBC
PLATELET # BLD AUTO: 221 THOU/MM3 (ref 130–400)
PMV BLD AUTO: 9.3 FL (ref 9.4–12.4)
POTASSIUM SERPL-SCNC: 4.7 MEQ/L (ref 3.5–5.2)
PROT SERPL-MCNC: 6.9 G/DL (ref 6.1–8)
PSA SERPL-MCNC: 1.94 NG/ML (ref 0–1)
RBC # BLD AUTO: 4.55 MILL/MM3 (ref 4.7–6.1)
SODIUM SERPL-SCNC: 134 MEQ/L (ref 135–145)
T4 FREE SERPL-MCNC: 1.38 NG/DL (ref 0.93–1.68)
TIBC SERPL-MCNC: 347 UG/DL (ref 171–450)
TSH SERPL DL<=0.005 MIU/L-ACNC: 1.42 UIU/ML (ref 0.4–4.2)
VIT B12 SERPL-MCNC: 619 PG/ML (ref 211–911)
WBC # BLD AUTO: 3.3 THOU/MM3 (ref 4.8–10.8)

## 2024-09-17 ENCOUNTER — OFFICE VISIT (OUTPATIENT)
Dept: FAMILY MEDICINE CLINIC | Age: 74
End: 2024-09-17
Payer: MEDICARE

## 2024-09-17 VITALS
HEART RATE: 76 BPM | DIASTOLIC BLOOD PRESSURE: 70 MMHG | BODY MASS INDEX: 22.91 KG/M2 | RESPIRATION RATE: 14 BRPM | WEIGHT: 178.4 LBS | TEMPERATURE: 97.7 F | SYSTOLIC BLOOD PRESSURE: 120 MMHG

## 2024-09-17 DIAGNOSIS — Z23 NEED FOR INFLUENZA VACCINATION: ICD-10-CM

## 2024-09-17 DIAGNOSIS — D70.8 OTHER NEUTROPENIA (HCC): ICD-10-CM

## 2024-09-17 DIAGNOSIS — M05.771 RHEUMATOID ARTHRITIS INVOLVING BOTH ANKLES WITH POSITIVE RHEUMATOID FACTOR (HCC): ICD-10-CM

## 2024-09-17 DIAGNOSIS — R97.20 ELEVATED PSA: Primary | ICD-10-CM

## 2024-09-17 DIAGNOSIS — R53.83 OTHER FATIGUE: ICD-10-CM

## 2024-09-17 DIAGNOSIS — R79.9 ABNORMAL FINDING OF BLOOD CHEMISTRY, UNSPECIFIED: ICD-10-CM

## 2024-09-17 DIAGNOSIS — M05.772 RHEUMATOID ARTHRITIS INVOLVING BOTH ANKLES WITH POSITIVE RHEUMATOID FACTOR (HCC): ICD-10-CM

## 2024-09-17 PROCEDURE — G0008 ADMIN INFLUENZA VIRUS VAC: HCPCS | Performed by: NURSE PRACTITIONER

## 2024-09-17 PROCEDURE — 1123F ACP DISCUSS/DSCN MKR DOCD: CPT | Performed by: NURSE PRACTITIONER

## 2024-09-17 PROCEDURE — 99213 OFFICE O/P EST LOW 20 MIN: CPT | Performed by: NURSE PRACTITIONER

## 2024-09-17 PROCEDURE — 90653 IIV ADJUVANT VACCINE IM: CPT | Performed by: NURSE PRACTITIONER

## 2024-09-17 ASSESSMENT — ENCOUNTER SYMPTOMS
ABDOMINAL DISTENTION: 0
CONSTIPATION: 0
BLOOD IN STOOL: 0
RHINORRHEA: 0
COUGH: 0
ANAL BLEEDING: 0
ABDOMINAL PAIN: 0
EYE DISCHARGE: 0
COLOR CHANGE: 0
EYE REDNESS: 0
DIARRHEA: 0
NAUSEA: 0
SORE THROAT: 0
SHORTNESS OF BREATH: 0

## 2025-07-22 ENCOUNTER — HOSPITAL ENCOUNTER (EMERGENCY)
Age: 75
Discharge: HOME | End: 2025-07-22
Payer: MEDICARE

## 2025-07-22 VITALS
SYSTOLIC BLOOD PRESSURE: 143 MMHG | DIASTOLIC BLOOD PRESSURE: 92 MMHG | HEART RATE: 89 BPM | TEMPERATURE: 98.2 F | RESPIRATION RATE: 20 BRPM | OXYGEN SATURATION: 99 %

## 2025-07-22 DIAGNOSIS — L03.116 CELLULITIS OF LEFT LOWER LEG: Primary | ICD-10-CM

## 2025-07-22 PROCEDURE — 99203 OFFICE O/P NEW LOW 30 MIN: CPT

## 2025-07-22 PROCEDURE — 99203 OFFICE O/P NEW LOW 30 MIN: CPT | Performed by: NURSE PRACTITIONER

## 2025-07-22 RX ORDER — SULFAMETHOXAZOLE AND TRIMETHOPRIM 800; 160 MG/1; MG/1
1 TABLET ORAL 2 TIMES DAILY
Qty: 20 TABLET | Refills: 0 | Status: SHIPPED | OUTPATIENT
Start: 2025-07-22 | End: 2025-08-01

## 2025-07-22 ASSESSMENT — ENCOUNTER SYMPTOMS
COLOR CHANGE: 1
COUGH: 0
SHORTNESS OF BREATH: 0

## 2025-07-22 NOTE — ED PROVIDER NOTES
[facility] URG[facility]ARE  UrgentCare Encounter      CHIEFCOMPLAINT       Chief Complaint   Patient presents with    Leg Pain     Left lower        Nurses Notes reviewed and I agree except as noted in the HPI.  HISTORY OF PRESENT ILLNESS     Herberth Gardner is a 74 y.o. male who presents to the urgent care for evaluation of his left shin being red, started 3 days ago. Wife is concerned for cellulitis. Has some redness, warmth, and swelling to the area.  Denies injury or trauma.     The patient/patient representative has no other acute complaints at this time.    REVIEW OF SYSTEMS     Review of Systems   Constitutional:  Negative for chills and fever.   Respiratory:  Negative for cough and shortness of breath.    Cardiovascular:  Negative for chest pain.   Skin:  Positive for color change.       PAST MEDICAL HISTORY         Diagnosis Date    Arthritis     Gout     Neuropathy     Osteoarthritis     Vitamin B12 deficiency        SURGICAL HISTORY     Patient  has a past surgical history that includes Colonoscopy; Foot surgery; back surgery; eye surgery; and pr colon ca scrn not hi rsk ind (Left, 11/3/2017).    CURRENT MEDICATIONS       Discharge Medication List as of 7/22/2025 10:14 AM        CONTINUE these medications which have NOT CHANGED    Details   APPLE CIDER VINEGAR PO Take by mouthHistorical Med      Cyanocobalamin (VITAMIN B 12 PO) Take by mouth dailyHistorical Med      Ascorbic Acid (VITAMIN C) 500 MG CAPS Take 500 mg by mouth daily Historical Med             ALLERGIES     Patient is has no known allergies.    FAMILY HISTORY     Patient'sfamily history includes Arthritis in his brother; Cancer in his father; Heart Disease in his father and mother; High Cholesterol in his brother; Lupus in his mother; No Known Problems in his sister; Other in his mother.    SOCIAL HISTORY     Patient  reports that he has never smoked. He has never used smokeless tobacco. He reports current alcohol use of about 3.0 standard drinks

## 2025-07-22 NOTE — ED TRIAGE NOTES
Pt to [facility] with c/o lower left leg pain x 3 days. Pt reports redness and pain to his lower left leg w/o any known cause.

## (undated) DEVICE — SET ADMIN 25ML L117IN PMP MOD CK VLV RLER CLMP 2 SMRTSITE

## (undated) DEVICE — ENDO KIT: Brand: MEDLINE INDUSTRIES, INC.

## (undated) DEVICE — TUBING IV STOPCOCK 48 CM 3 W

## (undated) DEVICE — IV START KIT: Brand: MEDLINE INDUSTRIES, INC.

## (undated) DEVICE — 2000CC GUARDIAN II: Brand: GUARDIAN

## (undated) DEVICE — CATHETER ETER IV 22GA L1IN POLYUR STR RADPQ INTROCAN SFTY

## (undated) DEVICE — SOLUTION IV 1000ML 0.45% SOD CHL PH 5 INJ USP VIAFLX PLAS